# Patient Record
Sex: FEMALE | Race: WHITE | NOT HISPANIC OR LATINO | Employment: OTHER | URBAN - METROPOLITAN AREA
[De-identification: names, ages, dates, MRNs, and addresses within clinical notes are randomized per-mention and may not be internally consistent; named-entity substitution may affect disease eponyms.]

---

## 2017-02-15 ENCOUNTER — OFFICE VISIT (OUTPATIENT)
Dept: FAMILY MEDICINE | Facility: CLINIC | Age: 47
End: 2017-02-15
Payer: COMMERCIAL

## 2017-02-15 ENCOUNTER — MYC MEDICAL ADVICE (OUTPATIENT)
Dept: FAMILY MEDICINE | Facility: CLINIC | Age: 47
End: 2017-02-15

## 2017-02-15 VITALS
OXYGEN SATURATION: 99 % | TEMPERATURE: 97 F | BODY MASS INDEX: 23.76 KG/M2 | DIASTOLIC BLOOD PRESSURE: 74 MMHG | HEART RATE: 80 BPM | HEIGHT: 64 IN | WEIGHT: 139.2 LBS | SYSTOLIC BLOOD PRESSURE: 110 MMHG | RESPIRATION RATE: 14 BRPM

## 2017-02-15 DIAGNOSIS — R07.9 CHEST PAIN, UNSPECIFIED TYPE: Primary | ICD-10-CM

## 2017-02-15 DIAGNOSIS — F41.1 GAD (GENERALIZED ANXIETY DISORDER): ICD-10-CM

## 2017-02-15 LAB — TROPONIN I SERPL-MCNC: NORMAL UG/L (ref 0–0.04)

## 2017-02-15 PROCEDURE — 86141 C-REACTIVE PROTEIN HS: CPT | Performed by: OBSTETRICS & GYNECOLOGY

## 2017-02-15 PROCEDURE — 84484 ASSAY OF TROPONIN QUANT: CPT | Performed by: OBSTETRICS & GYNECOLOGY

## 2017-02-15 PROCEDURE — 36415 COLL VENOUS BLD VENIPUNCTURE: CPT | Performed by: OBSTETRICS & GYNECOLOGY

## 2017-02-15 PROCEDURE — 93000 ELECTROCARDIOGRAM COMPLETE: CPT | Performed by: OBSTETRICS & GYNECOLOGY

## 2017-02-15 PROCEDURE — 99214 OFFICE O/P EST MOD 30 MIN: CPT | Performed by: OBSTETRICS & GYNECOLOGY

## 2017-02-15 ASSESSMENT — ANXIETY QUESTIONNAIRES
3. WORRYING TOO MUCH ABOUT DIFFERENT THINGS: NEARLY EVERY DAY
7. FEELING AFRAID AS IF SOMETHING AWFUL MIGHT HAPPEN: MORE THAN HALF THE DAYS
GAD7 TOTAL SCORE: 15
5. BEING SO RESTLESS THAT IT IS HARD TO SIT STILL: SEVERAL DAYS
IF YOU CHECKED OFF ANY PROBLEMS ON THIS QUESTIONNAIRE, HOW DIFFICULT HAVE THESE PROBLEMS MADE IT FOR YOU TO DO YOUR WORK, TAKE CARE OF THINGS AT HOME, OR GET ALONG WITH OTHER PEOPLE: SOMEWHAT DIFFICULT
6. BECOMING EASILY ANNOYED OR IRRITABLE: MORE THAN HALF THE DAYS
1. FEELING NERVOUS, ANXIOUS, OR ON EDGE: NEARLY EVERY DAY
2. NOT BEING ABLE TO STOP OR CONTROL WORRYING: NEARLY EVERY DAY

## 2017-02-15 ASSESSMENT — PATIENT HEALTH QUESTIONNAIRE - PHQ9: 5. POOR APPETITE OR OVEREATING: SEVERAL DAYS

## 2017-02-15 ASSESSMENT — PAIN SCALES - GENERAL: PAINLEVEL: NO PAIN (0)

## 2017-02-15 NOTE — PROGRESS NOTES
Subjective:  She gets anxious quite often and recently is anxious because she is going on a cruise this weekend. She got some vague chest pains and left arm pains. Had a normal cardiac stress test 10 years ago.  Has noticed some electric shocks in the left arm over the past several days. Wants EKG to be reassured it is not her heart. No squeezing pain. No dyspnea . No HART or PND.   There is a +FH of heart disease.    LANCE score =15    PHQ 9= 8   There are no suicidal thoughts or any serious depression worries.      The past medical history, social history, past surgical history and family history as shown below have been reviewed by me today.  Past Medical History   Diagnosis Date     Endometriosis, site unspecified      Female infertility of unspecified origin         Allergies   Allergen Reactions     Benadryl [Diphenhydramine] Hives     Zithromax [Azithromycin Dihydrate]      Hives, unknown if from Zithromax.  Could have been new face cream or new type of popcorn she had that day      Current Outpatient Prescriptions   Medication Sig Dispense Refill     Multiple Vitamins-Minerals (MULTIVITAL PO)        Past Surgical History   Procedure Laterality Date     C  delivery only  2003     , Low Cervical     Social History     Social History     Marital status:      Spouse name: N/A     Number of children: N/A     Years of education: N/A     Social History Main Topics     Smoking status: Never Smoker     Smokeless tobacco: Never Used     Alcohol use Yes      Comment: occ     Drug use: No     Sexual activity: Yes     Partners: Male     Other Topics Concern     None     Social History Narrative     Family History   Problem Relation Age of Onset     Lipids Mother      high cholesterol     Hypertension Father      HEART DISEASE Father      CAD       ROS: A 12 point review of systems was done. Except for what is listed above in the HPI, the systems review is negative .      Objective: Vital signs:  "Blood pressure 110/74, pulse 80, temperature 97  F (36.1  C), temperature source Tympanic, resp. rate 14, height 5' 4\" (1.626 m), weight 139 lb 3.2 oz (63.1 kg), SpO2 99 %, not currently breastfeeding.    HEENT:  Normal   Sclerae and conjunctiva are normal.   Ear canals and TMs look normal.  Nasal mucosa is pink  - no polyps or masses seen.  sinuses are non tender to palpation.  Throat is unremarkable . Mucous membranes are moist.   Neck is supple, mobile, no adenoapthy or masses palpable. Normal range of motion noted.  Chest is clear to auscultation. No wheezes, rales or rhonchi heard.  cardiac exam is normal with s1, s2, no murmurs or adventitious sounds.Normal rate and rhythm is heard.  Exam of both arms is normal. Normal pulses in the wrists. Normal sensory and motor exams noted.    EKG normal- no ST changes or Q waves. NSR noted.    Assessment/Plan: A total of 25 minutes were spent face-to-face with this patient during today's consultation, with more than 50% of that time devoted to conversation and counseling about the management decisions. Mostly spent discussing anxiety and how to deal with it.      1. Vague chest pains and left arm sx- normal exam. i will check troponin because she is very worried. Also will arrange for stress echo to reassure her. Will call  with lab result.    2.  Anxiety- we discussed walking 3-5 miles a day to help relieve her stress- she is a very anxious person. Medication not advised.        JANNET Marquez MD              "

## 2017-02-15 NOTE — TELEPHONE ENCOUNTER
Gisel Gauthier is a 46 year old female who sent [employer] message with concerns about chest pain.  Patient reports that over the past 10 days she has had 3 episodes of chest pains.  Reports that each time she was in a high anxiety situation, so she is not sure if it is anxiety related.  Reports that pain felt like a electrical shock that would last about 4 hours.  Patient said it is really not painful, but rates pain a 3/10 when it happens.  Reports that she has some tingling down the left arm, but reports that she does have some down the right arm also.  Reports that she is more tired recently, but reports that she feels this is related to not sleeping well.  Denies shortness of breath, nausea or vomiting.  Reports that she is leaving vacation on Sunday and is concerned about symptoms.  Patient reports that she did look up information on the Internet and it was suggested to have lab work and a EKG completed.       NURSING ASSESSMENT:  Description:  Chest pain.   Onset/duration:  Past 10 days.   Precip. factors:  Dx of hyperlipidemia   Associated symptoms:  Tingling down the arms, tired.   Improves/worsens symptoms:  No change.   Pain scale (0-10)   3/10  Last exam/Treatment:  03/09/2016  Allergies:   Allergies   Allergen Reactions     Benadryl [Diphenhydramine] Hives     Zithromax [Azithromycin Dihydrate]      Hives, unknown if from Zithromax.  Could have been new face cream or new type of popcorn she had that day      NURSING PLAN: Routed to provider Yes    RECOMMENDED DISPOSITION:  Patient declined ED as she reports that she cannot afford that with her insurance at this time.  Patient reports that she only wants lab work and EKG per suggestions on the Internet.  Reports that she leaves on Sunday and would like to put her mind at ease that there is nothing wrong before she leaves.  Will route to PCP for treatment, orders, or appointment.   Will comply with recommendation: Yes  If further questions/concerns or if  symptoms do not improve, worsen or new symptoms develop, call your PCP or [facility] as soon as possible.    Guideline used:  Chest pain  Telephone Triage Protocols for Nurses, Fifth Edition, Anamaria Mota RN

## 2017-02-15 NOTE — MR AVS SNAPSHOT
After Visit Summary   2/15/2017    Gisel Gauthier    MRN: [id number]           Patient Information     Date Of Birth          1970        Visit Information        Provider Department      2/15/2017 3:30 PM Everett Marquez MD [facility]         Follow-ups after your visit        Your next 10 appointments already scheduled     Feb 15, 2017  3:30 PM CST   Office Visit with Everett Marquez MD   [facility]    [address] [place] [id number] [phone]           Bring a current list of meds and any records pertaining to this visit.  For Physicals, please bring immunization records and any forms needing to be filled out.  Please arrive 10 minutes early to complete paperwork.              Who to contact     If you have questions or need follow up information about today's clinic visit or your schedule please contact [facility] directly at [phone].  Normal or non-critical lab and imaging results will be communicated to you by [employer] Labhart, letter or phone within 4 business days after the clinic has received the results. If you do not hear from us within 7 days, please contact the clinic through [employer]el Toned[employer] or phone. If you have a critical or abnormal lab result, we will notify you by phone as soon as possible.  Submit refill requests through [employer] or call your pharmacy and they will forward the refill request to us. Please allow 3 business days for your refill to be completed.          Additional Information About Your Visit        MyChart Information     [employer] gives you secure access to your electronic health record. If you see a primary care provider, you can also send messages to your care team and make appointments. If you have questions, please call your primary care clinic.  If you do not have a primary care provider, please call [phone] and they will assist you.        Care  "EveryWhere ID     This is your Care EveryWhere ID. This could be used by other organizations to access your [facility] medical records  [id number]        Your Vitals Were     Pulse Temperature Respirations Height Pulse Oximetry Breastfeeding?    80 97  F (36.1  C) (Tympanic) 14 5' 4\" (1.626 m) 99% No    BMI (Body Mass Index)                   23.89 kg/m2            Blood Pressure from Last 3 Encounters:   02/15/17 110/74   10/30/16 110/79   10/30/16 107/60    Weight from Last 3 Encounters:   02/15/17 139 lb 3.2 oz (63.1 kg)   10/30/16 135 lb (61.2 kg)   03/09/16 138 lb 4.8 oz (62.7 kg)              Today, you had the following     No orders found for display       Primary Care Provider Office Phone # Fax #    Everett Marquez [phone]       [place] [address] [place] [address]        Thank you!     Thank you for choosing [facility]  for your care. Our goal is always to provide you with excellent care. Hearing back from our patients is one way we can continue to improve our services. Please take a few minutes to complete the written survey that you may receive in the mail after your visit with us. Thank you!             Your Updated Medication List - Protect others around you: Learn how to safely use, store and throw away your medicines at www.disposemymeds.org.          This list is accurate as of: 2/15/17 11:41 AM.  Always use your most recent med list.                   Brand Name Dispense Instructions for use    MULTIVITAL PO            "

## 2017-02-15 NOTE — NURSING NOTE
"Chief Complaint   Patient presents with     Chest Pain       Initial /74 (BP Location: Left arm, Patient Position: Chair, Cuff Size: Adult Small)  Pulse 80  Temp 97  F (36.1  C) (Tympanic)  Resp 14  Ht 5' 4\" (1.626 m)  Wt 139 lb 3.2 oz (63.1 kg)  SpO2 99%  Breastfeeding? No  BMI 23.89 kg/m2 Estimated body mass index is 23.89 kg/(m^2) as calculated from the following:    Height as of this encounter: 5' 4\" (1.626 m).    Weight as of this encounter: 139 lb 3.2 oz (63.1 kg)..   BP completed using cuff size: idania Almonte CMA    "

## 2017-02-15 NOTE — LETTER
STRESS ECHO TEST   PAMPHLET: STRESS ECHOCARDIOGRAPH    Date of Appointment:______________Time: ________                                                                                     [place] at the central registration desk.    INSTRUCTIONS    1. NO CAFFEINE  the day of the test.  Examples: tea, coffee, pop, Anacin, Excedrin and chocolate products.  Other products/medications may contain caffeine, if in doubt, read the labels or call your pharmacist.    2. You may eat lightly up to three hours prior to the test.    3. No smoking on the day of the test.    4. Plan sixty to ninety minutes for your test.    5. Wear a comfortable two piece outfit and comfortable walking shoes.    6. If you wear a Nitro-Patch, do not put one on the morning of the test.    7. Take your medications in the morning with the exception of beta blockers. (refer to list below)      BETA BLOCKERS  Acebutolol, Atenolol, Beta-Chron, Betapace, Betaxolol, Bisoprolol,Blocadren, Carteolol, Cartrol, Carvediol, Coreg, Corgard, Corzide, Dectral, Fumarate, Inderal, Inderal LA, Inderide, Kerlone, Labetolol, Levatolol, Lopressor, Lopressor HCT, Metoprolol, Metoprolol XL, Nadolol, Normodyne, Penbutolol, Pindolol, Propanolol, Propanolol LA, Sectral, Sotalol, Tenoretic, Tenormin, Timolide, Timolol, Toprol XL, Trandate, Visken, Zebeta, Ziac    If you have significant problems with your lower extremities which would restrict you from walking at a brisk pace for five to ten minutes, please notify your physician.    Please bring a list of your medications along with you to this appointment.    If you have any questions please call us at [phone] or [phone] Monday through Friday 8am to 5pm.                    PATIENT INFORMATION  - STRESS ECHOCARDIOGRAPHY    The stress echocardiogram is a non-invasive test that combines a treadmill test and an echocardiogram.  An echocardiogram is done at rest prior to exercise and again after exercising.  The  echocardiogram uses sound waves (ultrasound) to provide an image of the heart.    You will be asked to fill out an information sheet regarding your current health status.  Questions will be asked related to your heart history.    The nurse or exercise physiologist will explain the procedure to you.  The upper chest will be exposed for placement of electrodes.  Women may wear a hospital gown during the test.  Men with chest hair will be shaved in the areas where the electrodes are placed.    You will be connected to an electrocardiogram machine.  An electrocardiogram and blood pressure will be monitored with you lying down and again standing still prior to exercising.    An echocardiogram will be done prior to exercising.  After this is completed, you will be ready for the treadmill portion of the test.    With staff in attendance, the treadmill will be started.  Periodically the speed and the grade of the treadmill will increase.  Let the staff know if you are experiencing chest pain, shortness of breath or other symptoms.    The length of time on the treadmill is individual.  However, the usual length of time is from five to ten minutes of walking.    After the heart rate is achieved to give us an accurate test, you will need to lie down very quickly and have an echocardiogram done with your heart rate elevated.    Please allow 60-90 minutes for the test to be completed.    The physician will review the results with you immediately after the test and send a final report to your Primary Care Provider.    If you have any special needs, please notify the department.  If you need an , please notify the department.  If you have questions, you may call the Outpatient Specialty Scheduling Department at [phone] or [phone] Monday through Friday 8am to 5pm.

## 2017-02-15 NOTE — TELEPHONE ENCOUNTER
Spoke with patient, per RM ok to add to schedule but cannot do this outpatient for her. She needs and office visit. Patient agreeable and will present to clinic.  Ann Almonte, CMA

## 2017-02-16 ASSESSMENT — PATIENT HEALTH QUESTIONNAIRE - PHQ9: SUM OF ALL RESPONSES TO PHQ QUESTIONS 1-9: 8

## 2017-02-16 ASSESSMENT — ANXIETY QUESTIONNAIRES: GAD7 TOTAL SCORE: 15

## 2017-02-17 LAB — CRP SERPL HS-MCNC: 1 MG/L

## 2017-02-20 NOTE — PROGRESS NOTES
Ann Please inform Gisel/ or caretaker  that this result(s) is/are normal.  The CRP is normal-Thanks. JANNET Marquez MD

## 2017-06-21 ENCOUNTER — TELEPHONE (OUTPATIENT)
Dept: FAMILY MEDICINE | Facility: CLINIC | Age: 47
End: 2017-06-21

## 2017-06-21 NOTE — TELEPHONE ENCOUNTER
Left message for patient to call back. Patient is due for pap. Please assist in scheduling. If patient declines please note and route back to care team. If the patient states that the order has been completed elsewhere, please obtain the date (month/year; day if available) and location and route back to care team for abstracting.

## 2017-06-21 NOTE — TELEPHONE ENCOUNTER
Panel Management Review      Patient has the following on her problem list: None      Composite cancer screening  Chart review shows that this patient is due/due soon for the following Pap Smear  Summary:    Patient is due/failing the following:   PAP    Action needed:   Patient needs office visit for pap.    Type of outreach:    Phone, left message for patient to call back.     Questions for provider review:    None                                                                                                                                    Ann Almonte CMA       Chart routed to Care Team .

## 2017-06-23 ENCOUNTER — HEALTH MAINTENANCE LETTER (OUTPATIENT)
Age: 47
End: 2017-06-23

## 2017-06-28 ENCOUNTER — OFFICE VISIT (OUTPATIENT)
Dept: FAMILY MEDICINE | Facility: CLINIC | Age: 47
End: 2017-06-28
Payer: COMMERCIAL

## 2017-06-28 VITALS
SYSTOLIC BLOOD PRESSURE: 100 MMHG | BODY MASS INDEX: 24.55 KG/M2 | DIASTOLIC BLOOD PRESSURE: 72 MMHG | HEART RATE: 76 BPM | RESPIRATION RATE: 16 BRPM | TEMPERATURE: 97.5 F | WEIGHT: 143 LBS | OXYGEN SATURATION: 99 %

## 2017-06-28 DIAGNOSIS — Z12.4 SCREENING FOR MALIGNANT NEOPLASM OF CERVIX: ICD-10-CM

## 2017-06-28 DIAGNOSIS — Z00.00 ENCOUNTER FOR WELL ADULT EXAM WITHOUT ABNORMAL FINDINGS: Primary | ICD-10-CM

## 2017-06-28 PROCEDURE — G0145 SCR C/V CYTO,THINLAYER,RESCR: HCPCS | Performed by: OBSTETRICS & GYNECOLOGY

## 2017-06-28 PROCEDURE — 87624 HPV HI-RISK TYP POOLED RSLT: CPT | Performed by: OBSTETRICS & GYNECOLOGY

## 2017-06-28 PROCEDURE — 99213 OFFICE O/P EST LOW 20 MIN: CPT | Performed by: OBSTETRICS & GYNECOLOGY

## 2017-06-28 ASSESSMENT — PAIN SCALES - GENERAL: PAINLEVEL: NO PAIN (0)

## 2017-06-28 NOTE — PROGRESS NOTES
Subjective: she is due for pap and asked to have pap and pelvic exam done today. She has occasional pains over the right adnexal area.      The past medical history, social history, past surgical history and family history as shown below have been reviewed by me today.  Past Medical History:   Diagnosis Date     Endometriosis, site unspecified      Female infertility of unspecified origin         Allergies   Allergen Reactions     Benadryl [Diphenhydramine] Hives     Zithromax [Azithromycin Dihydrate]      Hives, unknown if from Zithromax.  Could have been new face cream or new type of popcorn she had that day      Current Outpatient Prescriptions   Medication Sig Dispense Refill     Multiple Vitamins-Minerals (MULTIVITAL PO)        Past Surgical History:   Procedure Laterality Date     C  DELIVERY ONLY  2003    , Low Cervical     Social History     Social History     Marital status:      Spouse name: N/A     Number of children: N/A     Years of education: N/A     Social History Main Topics     Smoking status: Never Smoker     Smokeless tobacco: Never Used     Alcohol use Yes      Comment: occ     Drug use: No     Sexual activity: Yes     Partners: Male     Other Topics Concern     Not on file     Social History Narrative     Family History   Problem Relation Age of Onset     Lipids Mother      high cholesterol     Hypertension Father      HEART DISEASE Father      CAD       ROS: A 12 point review of systems was done. Except for what is listed above in the HPI, the systems review is negative .      Objective: Vital signs: Blood pressure 100/72, pulse 76, temperature 97.5  F (36.4  C), temperature source Tympanic, resp. rate 16, weight 143 lb (64.9 kg), last menstrual period 2017, SpO2 99 %, not currently breastfeeding.    Pelvic exam:My nurse Ann   was present to chaperone the exam.    The external genitalia appeared normal.      The vaginal vault was without bleeding  or    discharge or odor.      The cervix was smooth and shiny and normal in appearance.      A pap was obtained.       No vaginal support defects were noted,      Bimanual exam revealed a  6  week   sized uterus. It does not  descend   well in the vaginal vault.       The right adnexa is slightly prominent so i advised her to consider a pelvic US but she declined.    There was no  cervical motion tenderness.      Exam was NOT   limited by the patient's body habitus.              Assessment/Plan:    1.  Normal pelvic exam except for slighly prominent right ovary- i wonder if she has a cyst- she declined US- will let me know if she devlops any significant pain or fullness on that side-  We will notify her of pap results.    Note: i saw that her ET  Was slightly prominent last year and due to stenotic cervix i couldn't get embx- i advised her to have US now to look at the ET- she declined- she will let me know if she devlops any bleeding inbetween menses and then we will look into Hysteroscopy, dilatation and curettage      JANNET Marquez MD

## 2017-06-28 NOTE — MR AVS SNAPSHOT
After Visit Summary   6/28/2017    Gisel Gauthier    MRN: [id number]           Patient Information     Date Of Birth          1970        Visit Information        Provider Department      6/28/2017 1:50 PM Everett Marquez MD [facility]        Today's Diagnoses     Encounter for well adult exam without abnormal findings    -  1    Screening for malignant neoplasm of cervix           Follow-ups after your visit        Who to contact     If you have questions or need follow up information about today's clinic visit or your schedule please contact [facility] directly at [phone].  Normal or non-critical lab and imaging results will be communicated to you by [employer] Airlinehart, letter or phone within 4 business days after the clinic has received the results. If you do not hear from us within 7 days, please contact the clinic through [employer] Airlinehart or phone. If you have a critical or abnormal lab result, we will notify you by phone as soon as possible.  Submit refill requests through [employer] or call your pharmacy and they will forward the refill request to us. Please allow 3 business days for your refill to be completed.          Additional Information About Your Visit        MyChart Information     [employer] gives you secure access to your electronic health record. If you see a primary care provider, you can also send messages to your care team and make appointments. If you have questions, please call your primary care clinic.  If you do not have a primary care provider, please call [phone] and they will assist you.        Care EveryWhere ID     This is your Care EveryWhere ID. This could be used by other organizations to access your [facility] medical records  [id number]        Your Vitals Were     Pulse Temperature Respirations Last Period Pulse Oximetry Breastfeeding?    76 97.5  F (36.4  C) (Tympanic) 16 06/12/2017 (Approximate) 99% No    BMI (Body Mass Index)                    24.55 kg/m2            Blood Pressure from Last 3 Encounters:   06/28/17 100/72   02/15/17 110/74   10/30/16 110/79    Weight from Last 3 Encounters:   06/28/17 143 lb (64.9 kg)   02/15/17 139 lb 3.2 oz (63.1 kg)   10/30/16 135 lb (61.2 kg)              We Performed the Following     HPV High Risk Types DNA Cervical     Pap imaged thin layer screen with HPV - recommended age 30 - 65 years (select HPV order below)        Primary Care Provider Office Phone # Fax #    Everett Moses Marquez [phone]       [facility] [address] [place] [address]        Equal Access to Services     MERON LIGHT : Hadii aad ku hadasho Sogeorge, waaxda luqadaha, qaybta kaalmada adeegyada, blanca guerrero . So [facility] [phone].    ATENCIÓN: Si habla español, tiene a timmons disposición servicios gratuitos de asistencia lingüística. Llame al [phone].    We comply with applicable federal civil rights laws and [place] laws. We do not discriminate on the basis of race, color, national origin, age, disability sex, sexual orientation or gender identity.            Thank you!     Thank you for choosing [facility]  for your care. Our goal is always to provide you with excellent care. Hearing back from our patients is one way we can continue to improve our services. Please take a few minutes to complete the written survey that you may receive in the mail after your visit with us. Thank you!             Your Updated Medication List - Protect others around you: Learn how to safely use, store and throw away your medicines at www.disposemymeds.org.          This list is accurate as of: 6/28/17  2:40 PM.  Always use your most recent med list.                   Brand Name Dispense Instructions for use Diagnosis    MULTIVITAL PO

## 2017-06-28 NOTE — NURSING NOTE
"Chief Complaint   Patient presents with     Gyn Exam     Pap       Initial /72 (Cuff Size: Adult Regular)  Pulse 76  Temp 97.5  F (36.4  C) (Tympanic)  Resp 16  Wt 143 lb (64.9 kg)  LMP 06/12/2017 (Approximate)  SpO2 99%  Breastfeeding? No  BMI 24.55 kg/m2 Estimated body mass index is 24.55 kg/(m^2) as calculated from the following:    Height as of 2/15/17: 5' 4\" (1.626 m).    Weight as of this encounter: 143 lb (64.9 kg).  Medication Reconciliation: complete   Trent Ortiz MA      "

## 2017-07-05 LAB
COPATH REPORT: NORMAL
PAP: NORMAL

## 2017-07-07 ENCOUNTER — MYC MEDICAL ADVICE (OUTPATIENT)
Dept: FAMILY MEDICINE | Facility: CLINIC | Age: 47
End: 2017-07-07

## 2017-07-07 LAB
FINAL DIAGNOSIS: NORMAL
HPV HR 12 DNA CVX QL NAA+PROBE: NEGATIVE
HPV16 DNA SPEC QL NAA+PROBE: NEGATIVE
HPV18 DNA SPEC QL NAA+PROBE: NEGATIVE
SPECIMEN DESCRIPTION: NORMAL

## 2017-07-14 ENCOUNTER — MYC MEDICAL ADVICE (OUTPATIENT)
Dept: FAMILY MEDICINE | Facility: CLINIC | Age: 47
End: 2017-07-14

## 2017-07-14 DIAGNOSIS — R93.89 THICKENED ENDOMETRIUM: Primary | ICD-10-CM

## 2017-07-17 NOTE — TELEPHONE ENCOUNTER
Ok per RM for ultrasound. Order placed and Conversert message sent to patient for scheduling  Ann Almonte CMA

## 2017-07-20 ENCOUNTER — HOSPITAL ENCOUNTER (OUTPATIENT)
Dept: ULTRASOUND IMAGING | Facility: CLINIC | Age: 47
Discharge: HOME | End: 2017-07-20
Attending: OBSTETRICS & GYNECOLOGY | Admitting: OBSTETRICS & GYNECOLOGY
Payer: COMMERCIAL

## 2017-07-20 DIAGNOSIS — R93.89 THICKENED ENDOMETRIUM: ICD-10-CM

## 2017-07-20 PROCEDURE — 76830 TRANSVAGINAL US NON-OB: CPT

## 2017-07-24 NOTE — PROGRESS NOTES
Ann Please inform Gisel/ or caretaker  that this result(s) is/are normal. The lining is normal thickness and the ovaries look normal.Thanks. JANNET Marquez MD

## 2017-12-01 ENCOUNTER — OFFICE VISIT (OUTPATIENT)
Dept: FAMILY MEDICINE | Facility: CLINIC | Age: 47
End: 2017-12-01
Payer: COMMERCIAL

## 2017-12-01 VITALS
WEIGHT: 148 LBS | HEART RATE: 80 BPM | OXYGEN SATURATION: 100 % | DIASTOLIC BLOOD PRESSURE: 74 MMHG | TEMPERATURE: 98.6 F | BODY MASS INDEX: 25.27 KG/M2 | SYSTOLIC BLOOD PRESSURE: 122 MMHG | HEIGHT: 64 IN

## 2017-12-01 DIAGNOSIS — R39.11 URINARY HESITANCY: Primary | ICD-10-CM

## 2017-12-01 LAB
ALBUMIN UR-MCNC: NEGATIVE MG/DL
APPEARANCE UR: ABNORMAL
BILIRUB UR QL STRIP: NEGATIVE
COLOR UR AUTO: YELLOW
GLUCOSE UR STRIP-MCNC: NEGATIVE MG/DL
HGB UR QL STRIP: NEGATIVE
KETONES UR STRIP-MCNC: NEGATIVE MG/DL
LEUKOCYTE ESTERASE UR QL STRIP: ABNORMAL
MUCOUS THREADS #/AREA URNS LPF: PRESENT /LPF
NITRATE UR QL: NEGATIVE
PH UR STRIP: 7 PH (ref 5–7)
RBC #/AREA URNS AUTO: 1 /HPF (ref 0–2)
SOURCE: ABNORMAL
SP GR UR STRIP: 1.02 (ref 1–1.03)
SQUAMOUS #/AREA URNS AUTO: 12 /HPF (ref 0–1)
UROBILINOGEN UR STRIP-MCNC: 2 MG/DL (ref 0–2)
WBC #/AREA URNS AUTO: 1 /HPF (ref 0–2)

## 2017-12-01 PROCEDURE — 99213 OFFICE O/P EST LOW 20 MIN: CPT | Performed by: FAMILY MEDICINE

## 2017-12-01 PROCEDURE — 81001 URINALYSIS AUTO W/SCOPE: CPT | Performed by: FAMILY MEDICINE

## 2017-12-01 RX ORDER — SULFAMETHOXAZOLE/TRIMETHOPRIM 800-160 MG
1 TABLET ORAL 2 TIMES DAILY
Qty: 6 TABLET | Refills: 0 | Status: SHIPPED | OUTPATIENT
Start: 2017-12-01 | End: 2017-12-04

## 2017-12-01 ASSESSMENT — PAIN SCALES - GENERAL: PAINLEVEL: MILD PAIN (2)

## 2017-12-01 NOTE — MR AVS SNAPSHOT
"              After Visit Summary   12/1/2017    Gisel Gauthier    MRN: [id number]           Patient Information     Date Of Birth          1970        Visit Information        Provider Department      12/1/2017 8:45 AM Marek Dotson MD [facility]        Today's Diagnoses     Urinary hesitancy    -  1       Follow-ups after your visit        Who to contact     If you have questions or need follow up information about today's clinic visit or your schedule please contact [facility] directly at [phone].  Normal or non-critical lab and imaging results will be communicated to you by [employer], letter or phone within 4 business days after the clinic has received the results. If you do not hear from us within 7 days, please contact the clinic through [employer] or phone. If you have a critical or abnormal lab result, we will notify you by phone as soon as possible.  Submit refill requests through [employer] or call your pharmacy and they will forward the refill request to us. Please allow 3 business days for your refill to be completed.          Additional Information About Your Visit        MyChart Information     [employer] gives you secure access to your electronic health record. If you see a primary care provider, you can also send messages to your care team and make appointments. If you have questions, please call your primary care clinic.  If you do not have a primary care provider, please call [phone] and they will assist you.        Care EveryWhere ID     This is your Care EveryWhere ID. This could be used by other organizations to access your [facility] medical records  [id number]        Your Vitals Were     Pulse Temperature Height Pulse Oximetry BMI (Body Mass Index)       80 98.6  F (37  C) (Temporal) 5' 4\" (1.626 m) 100% 25.4 kg/m2        Blood Pressure from Last 3 Encounters:   12/01/17 122/74   06/28/17 100/72   02/15/17 110/74    Weight from Last 3 Encounters: "   12/01/17 148 lb (67.1 kg)   06/28/17 143 lb (64.9 kg)   02/15/17 139 lb 3.2 oz (63.1 kg)              We Performed the Following     UA with Microscopic (Bhavna Jeronimo; [facility])          Today's Medication Changes          These changes are accurate as of: 12/1/17 11:59 PM.  If you have any questions, ask your nurse or doctor.               Start taking these medicines.        Dose/Directions    sulfamethoxazole-trimethoprim 800-160 MG per tablet   Commonly known as:  BACTRIM DS   Used for:  Urinary hesitancy   Started by:  Marek Dotson MD        Dose:  1 tablet   Take 1 tablet by mouth 2 times daily for 3 days   Quantity:  6 tablet   Refills:  0            Where to get your medicines      Some of these will need a paper prescription and others can be bought over the counter.  Ask your nurse if you have questions.     Bring a paper prescription for each of these medications     sulfamethoxazole-trimethoprim 800-160 MG per tablet                Primary Care Provider Office Phone # Fax #    Everett Marquez [phone]       [address] [place] [address]        Equal Access to Services     PRACHI [facility] HospitalJANNET AH: Hadii riki smith hadasho Sogeorge, waaxda luqadaha, qaybta kaalmada adehakan, blanca guerrero . So [facility] [phone].    ATENCIÓN: Si habla español, tiene a timmons disposición servicios gratuitos de asistencia lingüística. [facility] [phone].    We comply with applicable federal civil rights laws and [place] laws. We do not discriminate on the basis of race, color, national origin, age, disability, sex, sexual orientation, or gender identity.            Thank you!     Thank you for choosing [facility]  for your care. Our goal is always to provide you with excellent care. Hearing back from our patients is one way we can continue to improve our services. Please take a few minutes to complete the written survey that you may  receive in the mail after your visit with us. Thank you!             Your Updated Medication List - Protect others around you: Learn how to safely use, store and throw away your medicines at www.disposemymeds.org.          This list is accurate as of: 12/1/17 11:59 PM.  Always use your most recent med list.                   Brand Name Dispense Instructions for use Diagnosis    MULTIVITAL PO           sulfamethoxazole-trimethoprim 800-160 MG per tablet    BACTRIM DS    6 tablet    Take 1 tablet by mouth 2 times daily for 3 days    Urinary hesitancy

## 2017-12-01 NOTE — NURSING NOTE
"Chief Complaint   Patient presents with     UTI     nagging backache for 1 week, on right side, urine is dark.        Initial /74  Pulse 80  Temp 98.6  F (37  C) (Temporal)  Ht 5' 4\" (1.626 m)  Wt 148 lb (67.1 kg)  SpO2 100%  BMI 25.4 kg/m2 Estimated body mass index is 25.4 kg/(m^2) as calculated from the following:    Height as of this encounter: 5' 4\" (1.626 m).    Weight as of this encounter: 148 lb (67.1 kg).  Medication Reconciliation: complete    "

## 2017-12-01 NOTE — PROGRESS NOTES
"  SUBJECTIVE:   Gisel Gauthier is a 47 year old female who presents to clinic today for the following health issues:      URINARY TRACT SYMPTOMS  Onset: 1 week     Description:   Painful urination (Dysuria): no   Blood in urine (Hematuria): no   Delay in urine (Hesitency): YES    Intensity: mild    Progression of Symptoms:  same    Accompanying Signs & Symptoms:  Fever/chills: no   Flank pain YES, right side  Nausea and vomiting: no   Any vaginal symptoms: none  Abdominal/Pelvic Pain: YES    History:   History of frequent UTI's: yes, but not for a long time.    History of kidney stones: no   Sexually Active: YES  Possibility of pregnancy: No    Precipitating factors:       Therapies Tried and outcome:   Increase fluid intake      Problem list and histories reviewed & adjusted, as indicated.  Additional history: as documented    Reviewed and updated as needed this visit by clinical staffTobacco  Allergies  Meds  Problems  Med Hx  Surg Hx  Fam Hx  Soc Hx        Reviewed and updated as needed this visit by Provider  Allergies  Meds  Problems         Patient does not remember what happened with past UTI as it was a long time ago, but she does not feel like it is exactly a UTI.  She has hesitancy only.  She is leaving [facility] for a trip this weekend and is worried about progression of symptoms.  She has been drinking plenty of fluids to help clear possible UTI.  No hematuria.  Her above noted flank pain is more lower right sided back pain than it is up at level of kidney.      ROS:  10 point ROS of systems including Constitutional, Eyes, Respiratory, Cardiovascular, Gastroenterology, Genitourinary, Integumentary, Muscularskeletal, Psychiatric were all negative except for pertinent positives noted in my HPI.     OBJECTIVE:   /74  Pulse 80  Temp 98.6  F (37  C) (Temporal)  Ht 5' 4\" (1.626 m)  Wt 148 lb (67.1 kg)  SpO2 100%  BMI 25.4 kg/m2  Body mass index is 25.4 kg/(m^2).  Physical Exam "   Constitutional: She appears well-developed and well-nourished.   Cardiovascular: Normal rate, regular rhythm, S1 normal, S2 normal and normal heart sounds.    No murmur heard.  Pulmonary/Chest: Effort normal and breath sounds normal. No respiratory distress. She has no wheezes. She has no rhonchi. She has no rales.   Abdominal: Bowel sounds are normal. There is tenderness in the suprapubic area. There is no CVA tenderness.   Neurological: She is alert.       Results for orders placed or performed in visit on 12/01/17   UA with Microscopic ([facility])   Result Value Ref Range    Color Urine Yellow     Appearance Urine Slightly Cloudy     Glucose Urine Negative NEG^Negative mg/dL    Bilirubin Urine Negative NEG^Negative    Ketones Urine Negative NEG^Negative mg/dL    Specific Gravity Urine 1.018 1.003 - 1.035    Blood Urine Negative NEG^Negative    pH Urine 7.0 5.0 - 7.0 pH    Protein Albumin Urine Negative NEG^Negative mg/dL    Urobilinogen mg/dL 2.0 0.0 - 2.0 mg/dL    Nitrite Urine Negative NEG^Negative    Leukocyte Esterase Urine Trace (A) NEG^Negative    Source Unspecified Urine     WBC Urine 1 0 - 2 /HPF    RBC Urine 1 0 - 2 /HPF    Squamous Epithelial /HPF Urine 12 (H) 0 - 1 /HPF    Mucous Urine Present (A) NEG^Negative /LPF        ASSESSMENT/PLAN:       ICD-10-CM    1. Urinary frequency R35.0 UA with Microscopic ([facility])     sulfamethoxazole-trimethoprim (BACTRIM DS) 800-160 MG per tablet     PLAN:  1.  I do not think patient has UTI at this time based on her symptoms, exam and lab results.  Since she is going out of town for the weekend, I will print out a prescription for bactrim to have with her and bring to any pharmacy to fill if she has worsening of UTI symptoms.      Follow up with Provider - only if symptoms do not fully resolve within a week, sooner if they worsen.     Marek Dotson MD   [facility]

## 2018-03-27 ENCOUNTER — E-VISIT (OUTPATIENT)
Dept: FAMILY MEDICINE | Facility: CLINIC | Age: 48
End: 2018-03-27
Payer: COMMERCIAL

## 2018-03-27 DIAGNOSIS — Z53.9 ERRONEOUS ENCOUNTER--DISREGARD: Primary | ICD-10-CM

## 2018-03-27 PROCEDURE — 99207 ZZC NO BILLABLE SERVICE THIS VISIT: CPT | Performed by: OBSTETRICS & GYNECOLOGY

## 2018-03-27 NOTE — MR AVS SNAPSHOT
After Visit Summary   3/27/2018    Gisel Gauthier    MRN: [id number]           Patient Information     Date Of Birth          1970        Visit Information        Provider Department      3/27/2018 6:07 AM Everett Marquez MD [facility]        Today's Diagnoses     ERRONEOUS ENCOUNTER--DISREGARD    -  1       Follow-ups after your visit        Who to contact     If you have questions or need follow up information about today's clinic visit or your schedule please contact [facility] directly at [phone].  Normal or non-critical lab and imaging results will be communicated to you by [employer], letter or phone within 4 business days after the clinic has received the results. If you do not hear from us within 7 days, please contact the clinic through [employer] or phone. If you have a critical or abnormal lab result, we will notify you by phone as soon as possible.  Submit refill requests through [email or web address] or call your pharmacy and they will forward the refill request to us. Please allow 3 business days for your refill to be completed.          Additional Information About Your Visit        MyChart Information     [email or web address] gives you secure access to your electronic health record. If you see a primary care provider, you can also send messages to your care team and make appointments. If you have questions, please call your primary care clinic.  If you do not have a primary care provider, please call [phone] and they will assist you.        Care EveryWhere ID     This is your Care EveryWhere ID. This could be used by other organizations to access your [facility] medical records  [id number]         Blood Pressure from Last 3 Encounters:   12/01/17 122/74   06/28/17 100/72   02/15/17 110/74    Weight from Last 3 Encounters:   12/01/17 148 lb (67.1 kg)   06/28/17 143 lb (64.9 kg)   02/15/17 139 lb 3.2 oz (63.1 kg)              Today, you had the following     No  orders found for display       Primary Care Provider Office Phone # Fax #    Everett Marquez [phone]       [address] [place] [address]        Equal Access to Services     MERON LIGHT : Hadsid smith hannaho Somireilleali, waaxda luqadaha, qaybta kaalmada adeegyada, blanca arreolan adrianaazam guerrero [place]. So [facility] [phone].    ATENCIÓN: Si habla español, tiene a timmons disposición servicios gratuitos de asistencia lingüística. Llame al [phone].    We comply with applicable federal civil rights laws and [place] laws. We do not discriminate on the basis of race, color, national origin, age, disability, sex, sexual orientation, or gender identity.            Thank you!     Thank you for choosing [facility]  for your care. Our goal is always to provide you with excellent care. Hearing back from our patients is one way we can continue to improve our services. Please take a few minutes to complete the written survey that you may receive in the mail after your visit with us. Thank you!             Your Updated Medication List - Protect others around you: Learn how to safely use, store and throw away your medicines at www.disposemymeds.org.          This list is accurate as of 3/27/18  7:43 AM.  Always use your most recent med list.                   Brand Name Dispense Instructions for use Diagnosis    MULTIVITAL PO

## 2018-03-28 ENCOUNTER — MYC MEDICAL ADVICE (OUTPATIENT)
Dept: FAMILY MEDICINE | Facility: CLINIC | Age: 48
End: 2018-03-28

## 2018-03-28 DIAGNOSIS — R07.9 CHEST PAIN, UNSPECIFIED TYPE: Primary | ICD-10-CM

## 2018-03-28 NOTE — TELEPHONE ENCOUNTER
Gisel Gauthier is a 47 year old female-     I spoke with patient.   She states that this chest pain is the same that she has been experiencing for the past couple years. She was aware of the plan to do a stress test and was going to do it, but then she started feeling better.   The last few months her symptoms have returned. Reports feeling fatigued, winded when going up stairs and a slight ache in her chest from time to time.  DENIES chest pain currently, difficulty breathing, shortness of breath, pain that radiates to her arm or jaw.  Pt states that these are the same symptoms she had in the past.  She has a history of heart disease in her family and has put on some weight so she would like to have the stress test now.  DECLINED going to the ER or scheduling an office appt.   Would like a message sent to provider to order stress test. Pt aware that it will be tomorrow for response due to time of day.   Please contact pt with recommendations.   Gabriela Holden RN, BSN

## 2018-03-28 NOTE — TELEPHONE ENCOUNTER
RN triage for symptoms as the encounter we saw her for her chest pains was 1 year ago.    Ann Almonte, CMA

## 2018-03-29 NOTE — TELEPHONE ENCOUNTER
Ok per RM for stress echo. Order placed and number sent to patient to schedule via [employer].  Ann Almonte, KAROLINA

## 2018-04-02 ENCOUNTER — HOSPITAL ENCOUNTER (OUTPATIENT)
Dept: CARDIOLOGY | Facility: CLINIC | Age: 48
Discharge: HOME | End: 2018-04-02
Attending: OBSTETRICS & GYNECOLOGY | Admitting: OBSTETRICS & GYNECOLOGY
Payer: COMMERCIAL

## 2018-04-02 DIAGNOSIS — R07.9 CHEST PAIN, UNSPECIFIED TYPE: ICD-10-CM

## 2018-04-02 PROCEDURE — 93350 STRESS TTE ONLY: CPT | Mod: 26 | Performed by: INTERNAL MEDICINE

## 2018-04-02 PROCEDURE — 93016 CV STRESS TEST SUPVJ ONLY: CPT | Performed by: INTERNAL MEDICINE

## 2018-04-02 PROCEDURE — 93321 DOPPLER ECHO F-UP/LMTD STD: CPT | Mod: 26 | Performed by: INTERNAL MEDICINE

## 2018-04-02 PROCEDURE — 25500064 ZZH RX 255 OP 636: Performed by: OBSTETRICS & GYNECOLOGY

## 2018-04-02 PROCEDURE — 93017 CV STRESS TEST TRACING ONLY: CPT | Performed by: REHABILITATION PRACTITIONER

## 2018-04-02 PROCEDURE — 93018 CV STRESS TEST I&R ONLY: CPT | Performed by: INTERNAL MEDICINE

## 2018-04-02 PROCEDURE — 93325 DOPPLER ECHO COLOR FLOW MAPG: CPT | Mod: 26 | Performed by: INTERNAL MEDICINE

## 2018-04-02 RX ADMIN — HUMAN ALBUMIN MICROSPHERES AND PERFLUTREN 3 ML: 10; .22 INJECTION, SOLUTION INTRAVENOUS at 15:36

## 2018-04-04 NOTE — PROGRESS NOTES
Ann Please inform Gisel/ or caretaker  that this result(s) is/are normal.  Thanks. JANNET Marquez MD

## 2019-09-04 ENCOUNTER — OFFICE VISIT (OUTPATIENT)
Dept: FAMILY MEDICINE | Facility: CLINIC | Age: 49
End: 2019-09-04
Payer: COMMERCIAL

## 2019-09-04 VITALS
SYSTOLIC BLOOD PRESSURE: 102 MMHG | DIASTOLIC BLOOD PRESSURE: 66 MMHG | HEART RATE: 80 BPM | HEIGHT: 64 IN | OXYGEN SATURATION: 99 % | TEMPERATURE: 98.3 F | BODY MASS INDEX: 26.19 KG/M2 | RESPIRATION RATE: 16 BRPM | WEIGHT: 153.4 LBS

## 2019-09-04 DIAGNOSIS — N95.1 SYMPTOMATIC MENOPAUSAL OR FEMALE CLIMACTERIC STATES: Primary | ICD-10-CM

## 2019-09-04 DIAGNOSIS — R63.5 WEIGHT GAIN: ICD-10-CM

## 2019-09-04 LAB
FSH SERPL-ACNC: 7.7 IU/L
TSH SERPL DL<=0.005 MIU/L-ACNC: 2.39 MU/L (ref 0.4–4)

## 2019-09-04 PROCEDURE — 99214 OFFICE O/P EST MOD 30 MIN: CPT | Performed by: OBSTETRICS & GYNECOLOGY

## 2019-09-04 PROCEDURE — 84443 ASSAY THYROID STIM HORMONE: CPT | Performed by: OBSTETRICS & GYNECOLOGY

## 2019-09-04 PROCEDURE — 36415 COLL VENOUS BLD VENIPUNCTURE: CPT | Performed by: OBSTETRICS & GYNECOLOGY

## 2019-09-04 PROCEDURE — 83001 ASSAY OF GONADOTROPIN (FSH): CPT | Performed by: OBSTETRICS & GYNECOLOGY

## 2019-09-04 ASSESSMENT — MIFFLIN-ST. JEOR: SCORE: 1305.82

## 2019-09-04 ASSESSMENT — PAIN SCALES - GENERAL: PAINLEVEL: NO PAIN (0)

## 2019-09-04 NOTE — PROGRESS NOTES
Subjective: She has 2 concerns.  The first is that she is developing menopausal symptoms with hot flashes.  Also she has had some lighter menses.    The second concern is that she has been gaining some weight recently.  She may be eating more.  She wants her thyroid checked      The past medical history, social history, past surgical history and family history as shown below have been reviewed by me today.    Past Medical History:   Diagnosis Date     Endometriosis, site unspecified      Female infertility of unspecified origin         Allergies   Allergen Reactions     Benadryl [Diphenhydramine] Hives     Zithromax [Azithromycin Dihydrate]      Hives, unknown if from Zithromax.  Could have been new face cream or new type of popcorn she had that day      Current Outpatient Medications   Medication Sig Dispense Refill     Multiple Vitamins-Minerals (MULTIVITAL PO)        Past Surgical History:   Procedure Laterality Date     C  DELIVERY ONLY  2003    , Low Cervical     Social History     Socioeconomic History     Marital status:      Spouse name: None     Number of children: None     Years of education: None     Highest education level: None   Occupational History     None   Social Needs     Financial resource strain: None     Food insecurity:     Worry: None     Inability: None     Transportation needs:     Medical: None     Non-medical: None   Tobacco Use     Smoking status: Never Smoker     Smokeless tobacco: Never Used   Substance and Sexual Activity     Alcohol use: Yes     Comment: occ     Drug use: No     Sexual activity: Yes     Partners: Male   Lifestyle     Physical activity:     Days per week: None     Minutes per session: None     Stress: None   Relationships     Social connections:     Talks on phone: None     Gets together: None     Attends Church service: None     Active member of club or organization: None     Attends meetings of clubs or organizations: None      "Relationship status: None     Intimate partner violence:     Fear of current or ex partner: None     Emotionally abused: None     Physically abused: None     Forced sexual activity: None   Other Topics Concern     Parent/sibling w/ CABG, MI or angioplasty before 65F 55M? Not Asked   Social History Narrative     None     Family History   Problem Relation Age of Onset     Lipids Mother         high cholesterol     Hypertension Father      Heart Disease Father         CAD       ROS: A 12 point review of systems was done. Except for what is listed above in the HPI, the systems review is negative .      Objective: Vital signs: Blood pressure 102/66, pulse 80, temperature 98.3  F (36.8  C), temperature source Temporal, resp. rate 16, height 1.626 m (5' 4\"), weight 69.6 kg (153 lb 6.4 oz), last menstrual period 07/18/2019, SpO2 99 %, not currently breastfeeding.  HEENT normal  Neck is supple, mobile, no adenopathy or masses palpable. The thyroid feels normal.   Normal range of motion noted.  Chest is clear to auscultation.  No wheezes, rales or rhonchi heard.  Cardiac exam is normal with s1, s2, no murmurs or adventitious sounds.Normal rate and rhythm is heard.   Abdomen is Soft and nontender.  No masses felt.  Pelvic exam: Bimanual exam was done with my nurse Manju present.  I do not feel any pelvic masses.  Uterus feels normal.  No adnexal masses noted.  She is nontender to palpation.        Assessment/Plan: A total of 25 minutes were spent face-to-face with this patient during today's consultation, with more than 50% of that time devoted to conversation and counseling about the management decisions.      1.  She has menopausal symptoms.Hormone replacement issues discussed. With estrogen use, risks  include but are not limited to risk of breast cancer, deep vein thrombosis, and possible coronary artery disease, but benefits that offset those risks include less bone calcium loss, improvement of hot flashes and vaginal " dryness.She has decided to think about it awhile and  she will let me know later.        2.  She has had some recent weight gain.  I will check TSH today.  She is wondering about the pros and cons of doing a Ca1 25 test.  I told her it is not a reliable screening test for ovarian cancer and most insurances do not cover it but I would order it if she wants.  After discussing it further she declined.  We also discussed the possibility of doing a pelvic ultrasound to screen for ovarian cancer.  At this point she declined.    3. rechekc for physical exam sometime in the next few months- including mammogram, etc. I strongly advised her to get a mammogram scheduled and she declined today but she will think about it.         The above information was dictating using Dragon voice software and as a result there may be some irregularities that were not detected in my review of this note.    JANNET Marquez MD

## 2019-09-09 NOTE — RESULT ENCOUNTER NOTE
Manju/Trent Nava,Please inform Gisel/ or caretaker  that this result(s) is/are normal.  Thyroid is normal.  The FSH level suggests that her ovaries are still going to work for a while but I would suggest that she consider the estrogen anyway based on her symptoms.  Thanks. JANNET Marquez MD

## 2019-12-08 ENCOUNTER — HEALTH MAINTENANCE LETTER (OUTPATIENT)
Age: 49
End: 2019-12-08

## 2020-03-15 ENCOUNTER — HEALTH MAINTENANCE LETTER (OUTPATIENT)
Age: 50
End: 2020-03-15

## 2020-04-29 ENCOUNTER — OFFICE VISIT (OUTPATIENT)
Dept: FAMILY MEDICINE | Facility: CLINIC | Age: 50
End: 2020-04-29
Payer: COMMERCIAL

## 2020-04-29 ENCOUNTER — NURSE TRIAGE (OUTPATIENT)
Dept: FAMILY MEDICINE | Facility: CLINIC | Age: 50
End: 2020-04-29

## 2020-04-29 VITALS
SYSTOLIC BLOOD PRESSURE: 132 MMHG | BODY MASS INDEX: 27.31 KG/M2 | HEART RATE: 82 BPM | WEIGHT: 160 LBS | OXYGEN SATURATION: 100 % | RESPIRATION RATE: 20 BRPM | DIASTOLIC BLOOD PRESSURE: 80 MMHG | HEIGHT: 64 IN | TEMPERATURE: 98.4 F

## 2020-04-29 DIAGNOSIS — I49.9 CARDIAC ARRHYTHMIA, UNSPECIFIED CARDIAC ARRHYTHMIA TYPE: Primary | ICD-10-CM

## 2020-04-29 PROCEDURE — 99213 OFFICE O/P EST LOW 20 MIN: CPT | Performed by: INTERNAL MEDICINE

## 2020-04-29 ASSESSMENT — MIFFLIN-ST. JEOR: SCORE: 1330.76

## 2020-04-29 ASSESSMENT — PAIN SCALES - GENERAL: PAINLEVEL: NO PAIN (0)

## 2020-04-29 NOTE — PROGRESS NOTES
Subjective     Gisel Gauthier is a 49 year old female who presents to clinic today for the following health issues:    HPI   Chief Complaint   Patient presents with     Palpitations     3 weeks, 10-15 times daily, shortness of breath, tingling in arms, mostly left . Noticed when sitting.                         Chief Complaint         The patient is a pleasant 49-year-old female who for the last 3 weeks has been having cardiac arrhythmia.  She notes that it is intermittent, up to 15 times per day that she notices it, slightly more prevalent in the evenings when she is resting, and sometimes associated with some tingling in her left arm but that is extremely modest.  She is had no shortness of breath, no chest pain, no diaphoresis.  She notes minimal caffeine usage and is on no decongestants.  She is very busy at work and having no concerns regarding her employment.  She does have the usual baseline concerns regarding the current pandemic.  No prior cardiac history is noted.  She takes no medications.                         PAST, FAMILY,SOCIAL HISTORY:     Medical  History:   has a past medical history of Endometriosis, site unspecified and Female infertility of unspecified origin.     Surgical History:   has a past surgical history that includes  DELIVERY ONLY (2003).     Social History:   reports that she has never smoked. She has never used smokeless tobacco. She reports current alcohol use. She reports that she does not use drugs.     Family History:  family history includes Heart Disease in her father; Hypertension in her father; Lipids in her mother.            MEDICATIONS  Current Outpatient Medications   Medication Sig Dispense Refill     Multiple Vitamins-Minerals (MULTIVITAL PO)            --------------------------------------------------------------------------------------------------------------------                              Review of Systems       LUNGS: Pt denies: cough, excess  "sputum, hemoptysis, or shortness of breath.   HEART: Pt denies: chest pain,  syncope, tachy or bradyarrhythmia.   GI: Pt denies: nausea, vomiting, diarrhea, constipation, melena, or hematochezia.   NEURO: Pt denies: seizures, strokes, diplopia, weakness, paraesthesias, or paralysis.   SKIN: Pt denies: itching, rashes, discoloration, or specific lesions of concern. Denies recent hair loss.   PSYCH: The patient denies significant depression, anxiety, mood imbalance. Specifically denies any suicidal ideation.        Examination    /80 (BP Location: Right arm, Patient Position: Sitting, Cuff Size: Adult Regular)   Pulse 82   Temp 98.4  F (36.9  C) (Temporal)   Resp 20   Ht 1.626 m (5' 4\")   Wt 72.6 kg (160 lb)   SpO2 100%   BMI 27.46 kg/m      Constitutional: The patient appears to be in no acute distress. The patient appears to be adequately hydrated. No acute respiratory or hemodynamic distress is noted at this time.   LUNGS: clear bilaterally, airflow is brisk, no intercostal retraction or stridor is noted. No coughing is noted during visit.   HEART:  regular without rubs, clicks, gallops, or murmurs. PMI is nondisplaced. Upstrokes are brisk. S1,S2 are heard.  No arrhythmias noted at this time.   GI: Abdomen is soft, without rebound, guarding or tenderness. Bowel sounds are appropriate. No renal bruits are heard.   NEURO: Pt is alert and appropriate. No neurologic lateralization is noted. Cranial nerves 2-12 are intact. Peripheral sensory and motor function are grossly normal.    SKIN:  warm and dry. No erythema, or rashes are noted. No specific lesions of concern are noted.    PSYCH: The patient appears grossly appropriate. Maintains good eye contact, does not have any jittery or atypical motion. Displays appropriate affect.         Decision Making       1. Cardiac arrhythmia, unspecified cardiac arrhythmia type  We will set up a Holter monitor to better determine whether she has ventricular or " supraventricular extrasystoles.  Discussed suppressive therapy with beta-blockers though patient notes symptoms are not at this time significant enough.  Recommend avoiding caffeine, nicotine, decongestants etc.  - Holter Monitor 48 hour Adult Pediatric; Future                              FOLLOW UP   I have asked the patient to make an appointment for followup with me when results are available we will review these over the phone and discuss treatment options at her wishes.                I have carefully explained the diagnosis and treatment options to the patient.  The patient has displayed an understanding of the above, and all subsequent questions were answered.      DO LAMAR Sanderson    Portions of this note were produced using [employer]  Although every attempt at real-time proof reading has been made, occasional grammar/syntax errors may have been missed.

## 2020-04-29 NOTE — TELEPHONE ENCOUNTER
"    Reason for Disposition    [1] Palpitations AND [2] no improvement after using CARE ADVICE    Additional Information    Negative: Difficulty breathing    Negative: Dizziness, lightheadedness, or weakness    Negative: [1] Heart beating very rapidly (e.g., > 140 / minute) AND [2] present now  (Exception: during exercise)    Negative: Heart beating very slowly (e.g., < 50 / minute)  (Exception: athlete)    Negative: Chest pain    Negative: New or worsened shortness of breath with activity (dyspnea on exertion)    Negative: Patient sounds very sick or weak to the triager    Negative: [1] Heart beating very rapidly (e.g., > 140 / minute) AND [2] not present now  (Exception: during exercise)    Negative: [1] Skipped or extra beat(s) AND [2] increases with exercise or exertion    Negative: [1] Skipped or extra beat(s) AND [2] occurs 4 or more times per minute    Negative: New or worsened ankle swelling    Negative: History of heart disease  (i.e., heart attack, bypass surgery, angina, angioplasty, CHF) (Exception: brief heart beat symptoms that went away and now feels well)    Negative: Age > 60 years (Exception: brief heart beat symptoms that went away and now feels well)    Negative: Taking water pill (i.e., diuretic) or heart medication (e.g., digoxin)    Answer Assessment - Initial Assessment Questions  1. DESCRIPTION: \"Please describe your heart rate or heart beat that you are having\" (e.g., fast/slow, regular/irregular, skipped or extra beats, \"palpitations\")      palpitations  2. ONSET: \"When did it start?\" (Minutes, hours or days)       Been going on for weeks  3. DURATION: \"How long does it last\" (e.g., seconds, minutes, hours)      Maybe 1- 1/2 minutes.  10-15 times a day  4. PATTERN \"Does it come and go, or has it been constant since it started?\"  \"Does it get worse with exertion?\"   \"Are you feeling it now?\"      Comes and goes. Worse with sitting.  Not feeling it now  5. TAP: \"Using your hand, can you tap " "out what you are feeling on a chair or table in front of you, so that I can hear?\" (Note: not all patients can do this)           6. HEART RATE: \"Can you tell me your heart rate?\" \"How many beats in 15 seconds?\"  (Note: not all patients can do this)        Around 88 when going on.  Resting 68 with no symtoms  7. RECURRENT SYMPTOM: \"Have you ever had this before?\" If so, ask: \"When was the last time?\" and \"What happened that time?\"       Had a stress test with [facility] but dont remember the symptoms I was having.  8. CAUSE: \"What do you think is causing the palpitations?\"         9. CARDIAC HISTORY: \"Do you have any history of heart disease?\" (e.g., heart attack, angina, bypass surgery, angioplasty, arrhythmia)       no  10. OTHER SYMPTOMS: \"Do you have any other symptoms?\" (e.g., dizziness, chest pain, sweating, difficulty breathing)        Maybe chest tightness-like a rubberband around. Tingle in arms sometimes when it happens.  11. PREGNANCY: \"Is there any chance you are pregnant?\" \"When was your last menstrual period?\"        no    Protocols used: HEART RATE AND HEARTBEAT [id number]      "

## 2020-05-06 ENCOUNTER — HOSPITAL ENCOUNTER (OUTPATIENT)
Dept: CARDIOLOGY | Facility: CLINIC | Age: 50
Discharge: HOME | End: 2020-05-06
Attending: INTERNAL MEDICINE | Admitting: INTERNAL MEDICINE
Payer: COMMERCIAL

## 2020-05-06 DIAGNOSIS — I49.9 CARDIAC ARRHYTHMIA, UNSPECIFIED CARDIAC ARRHYTHMIA TYPE: ICD-10-CM

## 2020-05-06 PROCEDURE — 93226 XTRNL ECG REC<48 HR SCAN A/R: CPT

## 2020-05-06 PROCEDURE — 93227 XTRNL ECG REC<48 HR R&I: CPT | Performed by: INTERNAL MEDICINE

## 2020-05-15 NOTE — RESULT ENCOUNTER NOTE
"Dear Gisel, your recent test results are attached.  Approximately 5% of your heartbeats are what are called \"supraventricular extrasystoles\".  This is just a very fancy way of saying a slightly early heartbeat.  They are harmless and amount to no harm.  I would be hesitant to treat these, though if they are extremely inconveniencing, we could initiate therapy.  I would recommend the avoidance of caffeine, decongestants and other stimulants.  This will sometimes improve the problem.  Anxiety and nervousness (adrenaline) can also worsen the frequency of these extra heartbeats.  But overall, they are harmless.    You will be contacted with any outstanding results when they are available.  Feel free to contact me via the office or My Chart if you have any questions regarding the above.  Sincerely,  Heriberto Pike, DO FACOI"

## 2020-05-18 ENCOUNTER — TELEPHONE (OUTPATIENT)
Dept: FAMILY MEDICINE | Facility: CLINIC | Age: 50
End: 2020-05-18

## 2020-05-18 NOTE — TELEPHONE ENCOUNTER
"Notes recorded by Heriberto Pike DO on 5/15/2020 at 8:02 AM CDT       Dear Gisel, your recent test results are attached.   Approximately 5% of your heartbeats are what are called \"supraventricular extrasystoles\".  This is just a very fancy way of saying a slightly early heartbeat.  They are harmless and amount to no harm.   I would be hesitant to treat these, though if they are extremely inconveniencing, we could initiate therapy.   I would recommend the avoidance of caffeine, decongestants and other stimulants.  This will sometimes improve the problem.   Anxiety and nervousness (adrenaline) can also worsen the frequency of these extra heartbeats.   But overall, they are harmless.     You will be contacted with any outstanding results when they are available.   Feel free to contact me via the office or My Chart if you have any questions regarding the above.   Sincerely,  Heriberto Pike DO FACOI   "

## 2020-05-18 NOTE — TELEPHONE ENCOUNTER
Reason for Call:  Request for results:    Name of test or procedure: holter montitor    Date of test of procedure: 05/06/20 throught 05/07/20    Location of the test or procedure: [facility] to leave the result message on voice mail or with a family member? NO    Phone number Patient can be reached at:  Work number on file: [phone]      Additional comments: Please call Gisel with her test results    Call taken on 5/18/2020 at 7:59 AM by Brandi Rivero

## 2020-05-18 NOTE — TELEPHONE ENCOUNTER
Spoke with patient and informed of message below. Patient understood, no further questions or concerns at this time.     Britta Grubbs MA

## 2021-01-09 ENCOUNTER — HEALTH MAINTENANCE LETTER (OUTPATIENT)
Age: 51
End: 2021-01-09

## 2021-05-08 ENCOUNTER — HEALTH MAINTENANCE LETTER (OUTPATIENT)
Age: 51
End: 2021-05-08

## 2021-10-23 ENCOUNTER — HEALTH MAINTENANCE LETTER (OUTPATIENT)
Age: 51
End: 2021-10-23

## 2021-11-22 ASSESSMENT — ENCOUNTER SYMPTOMS
ABDOMINAL PAIN: 0
DYSURIA: 0
FEVER: 0
DIARRHEA: 0
HEADACHES: 1
CHILLS: 0
PARESTHESIAS: 0
FREQUENCY: 0
PALPITATIONS: 0
HEARTBURN: 0
HEMATURIA: 0
MYALGIAS: 0
WEAKNESS: 0
JOINT SWELLING: 0
EYE PAIN: 0
COUGH: 0
BREAST MASS: 0
NAUSEA: 0
SORE THROAT: 0
HEMATOCHEZIA: 0
CONSTIPATION: 0
DIZZINESS: 0
SHORTNESS OF BREATH: 0
NERVOUS/ANXIOUS: 0
ARTHRALGIAS: 1

## 2021-11-23 ENCOUNTER — OFFICE VISIT (OUTPATIENT)
Dept: FAMILY MEDICINE | Facility: CLINIC | Age: 51
End: 2021-11-23
Payer: COMMERCIAL

## 2021-11-23 ENCOUNTER — HOSPITAL ENCOUNTER (OUTPATIENT)
Dept: MAMMOGRAPHY | Facility: CLINIC | Age: 51
Discharge: HOME | End: 2021-11-23
Attending: OBSTETRICS & GYNECOLOGY | Admitting: OBSTETRICS & GYNECOLOGY
Payer: COMMERCIAL

## 2021-11-23 DIAGNOSIS — R33.9 URINARY RETENTION: ICD-10-CM

## 2021-11-23 DIAGNOSIS — Z00.00 WELL ADULT EXAM: Primary | ICD-10-CM

## 2021-11-23 DIAGNOSIS — Z12.31 VISIT FOR SCREENING MAMMOGRAM: ICD-10-CM

## 2021-11-23 DIAGNOSIS — N95.1 SYMPTOMATIC MENOPAUSAL OR FEMALE CLIMACTERIC STATES: ICD-10-CM

## 2021-11-23 LAB
ALBUMIN UR-MCNC: NEGATIVE MG/DL
APPEARANCE UR: CLEAR
BILIRUB UR QL STRIP: NEGATIVE
COLOR UR AUTO: YELLOW
GLUCOSE UR STRIP-MCNC: NEGATIVE MG/DL
HGB UR QL STRIP: NEGATIVE
KETONES UR STRIP-MCNC: 5 MG/DL
LEUKOCYTE ESTERASE UR QL STRIP: NEGATIVE
NITRATE UR QL: NEGATIVE
PH UR STRIP: 5 [PH] (ref 5–7)
SP GR UR STRIP: 1.02 (ref 1–1.03)
UROBILINOGEN UR STRIP-MCNC: NORMAL MG/DL

## 2021-11-23 PROCEDURE — 99396 PREV VISIT EST AGE 40-64: CPT | Performed by: OBSTETRICS & GYNECOLOGY

## 2021-11-23 PROCEDURE — 81003 URINALYSIS AUTO W/O SCOPE: CPT | Performed by: OBSTETRICS & GYNECOLOGY

## 2021-11-23 PROCEDURE — 77063 BREAST TOMOSYNTHESIS BI: CPT

## 2021-11-23 RX ORDER — MEDROXYPROGESTERONE ACETATE 5 MG
5 TABLET ORAL DAILY
Qty: 90 TABLET | Refills: 3 | Status: SHIPPED | OUTPATIENT
Start: 2021-11-23 | End: 2022-05-12

## 2021-11-23 RX ORDER — ESTRADIOL 2 MG/1
2 TABLET ORAL DAILY
Qty: 90 TABLET | Refills: 3 | Status: SHIPPED | OUTPATIENT
Start: 2021-11-23 | End: 2022-05-12

## 2021-11-23 NOTE — PROGRESS NOTES
Subjective:Gisel is here for yearly physical. Current concerns are: Some urinary retention and also some menopausal symptoms with vaginal dryness.  Would like to consider hormone replacement.  We have discussed the risks and benefits previously        Past Medical History:   Diagnosis Date     Endometriosis, site unspecified      Female infertility of unspecified origin       Current Outpatient Medications   Medication Sig Dispense Refill     estradiol (ESTRACE) 2 MG tablet Take 1 tablet (2 mg) by mouth daily 90 tablet 3     medroxyPROGESTERone (PROVERA) 5 MG tablet Take 1 tablet (5 mg) by mouth daily 90 tablet 3     Multiple Vitamins-Minerals (MULTIVITAL PO)         Allergies   Allergen Reactions     Benadryl [Diphenhydramine] Hives     Zithromax [Azithromycin Dihydrate]      Hives, unknown if from Zithromax.  Could have been new face cream or new type of popcorn she had that day       History   Smoking Status     Never Smoker   Smokeless Tobacco     Never Used      Past Surgical History:   Procedure Laterality Date     C  DELIVERY ONLY  2003    , Low Cervical      Social History     Tobacco Use     Smoking status: Never Smoker     Smokeless tobacco: Never Used   Substance Use Topics     Alcohol use: Yes     Comment: occ     Drug use: No      Family History   Problem Relation Age of Onset     Lipids Mother         high cholesterol     Hypertension Father      Heart Disease Father         CAD       ROS: A 12 point review of systems is negative except for the following:  See above      Physical Exam: 118/78-68-16-  HEENT:    Sclerae and conjunctiva are normal.   Ear canals and TMs look normal.  Nasal mucosa is pink  - no polyps or masses seen.  Throat is unremarkable . Mucous membranes are moist.   Neck is supple, mobile, no adenopathy or masses palpable. The thyroid feels normal.   Normal range of motion noted.  Chest is clear to auscultation.  No wheezes, rales or rhonchi heard.  Cardiac exam  is normal with s1, s2, no murmurs or adventitious sounds.Normal rate and rhythm is heard.   Abdomen is soft,  nondistended, No masses felt.No HSM. No guarding or rigidity or rebound   noted. Palpation reveals  no    tenderness   Normal bowel sounds heard.    Pelvic exam:My nurse Mary   was present to chaperone the exam.  The external genitalia appeared normal.    The vaginal vault was without bleeding  or   discharge   or odor.   The cervix was smooth   and shiny and normal in appearance.    No vaginal support defects were noted except a very subtle cystocele and laxity of the bladder neck.  Bimanual exam revealed a 6 week   sized uterus. It does not   descend   well in the vaginal vault.    No adnexal masses were felt.    There was no  cervical motion tenderness.            The breast exam was declined.      We did discuss the option for an exam   and I suggested that she should be doing a self-breast exam   monthly and after the age of 40 a yearly mammogram   and she feels comfortable that this is sufficient.             Assessment/Plan:    The yearly exam is normal except for 1.  Symptomatic menopausal state-we discussed pros and cons of using hormone replacement.  Including risks of breast cancer.  See Rx.    Urinary retention symptoms.  This is intermittent.  Will check urinalysis today.  If these persist then will refer to urology for consideration of cystoscopy.      Additional Plan:Diet and rest and exercise discussed.      I have advised going for a 5 mile walk daily if possible       See labs and orders.    .Immunizations reviewed(TDAP, pneumovax, shingles vaccine,etc)and discussed-including advice for yearly flu shot/tetanus update.     The following vaccines given today: He declined any.    Hepatitis C and HIV testing offered    these tests were declined.     Calcium supplementation advised     Colonoscopy at age 50  - we discussed this and she declined.    Mammogram  Is advised beginning at age 40-pt to  be responsible for getting this done     DEXA is advised beginning at age 65      Labs done: see orders I ordered a cholesterol but she declined it so I canceled it.  I advised the following exams with specialists:    1. Dental evaluation yearly    2. Dermatology evaluation for mole exam yearly    3. Ophthalmology exam yearly to check for glaucoma, etc          Living will was discussed deviously       Followup in 12   months, sooner if concerns arise.   JANNET Mraquez MD(electronic signature)

## 2021-11-24 ENCOUNTER — NURSE TRIAGE (OUTPATIENT)
Dept: NURSING | Facility: CLINIC | Age: 51
End: 2021-11-24
Payer: COMMERCIAL

## 2021-11-24 NOTE — TELEPHONE ENCOUNTER
Stated that she looked up on the Internet about the ketone in the urine which showed a red flag; she was concerned.  She had a UA done yesterday for feeling like unable to empty her bladder.  Doing well today    Went over the lab result with debi.  No sign of UTI.  Discussed with her about the significance of ketone level in the urine.  It is minimally elevated, most likely insignificant.  Will monitor for now.  Encouraged to drink a lot of water.  She felt comfortable with the plan and all of her questions were answered.    Neal Min MD.

## 2021-11-24 NOTE — TELEPHONE ENCOUNTER
Gisel was in yesterday and seen by Everett Rodríguez yesterday and noticed that her ketone levels were elevated in her lab results.  Gisel is requesting to speak with Everett Rodríguez regarding her lab results.  Please phone Gisel.    COVID 19 Nurse Triage Plan/Patient Instructions    Please be aware that novel coronavirus (COVID-19) may be circulating in the community. If you develop symptoms such as fever, cough, or SOB or if you have concerns about the presence of another infection including coronavirus (COVID-19), please contact your health care provider or visit [email or web address].     Disposition/Instructions    Home care recommended. Follow home care protocol based instructions.    Thank you for taking steps to prevent the spread of this virus.  o Limit your contact with others.  o Wear a simple mask to cover your cough.  o Wash your hands well and often.    Resources    [facility]: About COVID-19: [email or web address]    CDC: What to Do If You're Sick: www.cdc.gov/coronavirus/2019-ncov/about/steps-when-sick.html    CDC: Ending Home Isolation: www.cdc.gov/coronavirus/2019-ncov/hcp/disposition-in-home-patients.html     CDC: Caring for Someone: www.cdc.gov/coronavirus/2019-ncov/if-you-are-sick/care-for-someone.html     [facility]: Interim Guidance for Hospital Discharge to Home: www.health.[facility].[place]/diseases/coronavirus/hcp/hospdischarge.pdf    [facility] clinical trials (COVID-19 research studies): [email or web address]-clinical-trials     Below are the COVID-19 hotlines at the [place] [facility] of [facility] ([facility]). Interpreters are available.   o For health questions: Call 955-645-2929 or 1-833.469.8649 (7 a.m. to 7 p.m.)  o For questions about schools and childcare: Call 887-936-7160 or 1-596.416.8135 (7 a.m. to 7 p.m.)

## 2022-05-12 ENCOUNTER — OFFICE VISIT (OUTPATIENT)
Dept: FAMILY MEDICINE | Facility: CLINIC | Age: 52
End: 2022-05-12
Payer: COMMERCIAL

## 2022-05-12 VITALS
HEART RATE: 79 BPM | TEMPERATURE: 98.1 F | WEIGHT: 163 LBS | OXYGEN SATURATION: 100 % | DIASTOLIC BLOOD PRESSURE: 68 MMHG | SYSTOLIC BLOOD PRESSURE: 126 MMHG | BODY MASS INDEX: 27.98 KG/M2

## 2022-05-12 DIAGNOSIS — R10.11 RUQ ABDOMINAL PAIN: Primary | ICD-10-CM

## 2022-05-12 LAB
ALBUMIN SERPL-MCNC: 4.2 G/DL (ref 3.4–5)
ALBUMIN UR-MCNC: NEGATIVE MG/DL
ALP SERPL-CCNC: 66 U/L (ref 40–150)
ALT SERPL W P-5'-P-CCNC: 25 U/L (ref 0–50)
ANION GAP SERPL CALCULATED.3IONS-SCNC: 3 MMOL/L (ref 3–14)
APPEARANCE UR: CLEAR
AST SERPL W P-5'-P-CCNC: 16 U/L (ref 0–45)
BASOPHILS # BLD AUTO: 0.1 10E3/UL (ref 0–0.2)
BASOPHILS NFR BLD AUTO: 1 %
BILIRUB SERPL-MCNC: 0.5 MG/DL (ref 0.2–1.3)
BILIRUB UR QL STRIP: NEGATIVE
BUN SERPL-MCNC: 16 MG/DL (ref 7–30)
CALCIUM SERPL-MCNC: 8.9 MG/DL (ref 8.5–10.1)
CHLORIDE BLD-SCNC: 108 MMOL/L (ref 94–109)
CO2 SERPL-SCNC: 29 MMOL/L (ref 20–32)
COLOR UR AUTO: ABNORMAL
CREAT SERPL-MCNC: 0.6 MG/DL (ref 0.52–1.04)
EOSINOPHIL # BLD AUTO: 0.2 10E3/UL (ref 0–0.7)
EOSINOPHIL NFR BLD AUTO: 3 %
ERYTHROCYTE [DISTWIDTH] IN BLOOD BY AUTOMATED COUNT: 12.6 % (ref 10–15)
GFR SERPL CREATININE-BSD FRML MDRD: >90 ML/MIN/1.73M2
GLUCOSE BLD-MCNC: 106 MG/DL (ref 70–99)
GLUCOSE UR STRIP-MCNC: NEGATIVE MG/DL
HCT VFR BLD AUTO: 38.9 % (ref 35–47)
HGB BLD-MCNC: 13 G/DL (ref 11.7–15.7)
HGB UR QL STRIP: NEGATIVE
IMM GRANULOCYTES # BLD: 0 10E3/UL
IMM GRANULOCYTES NFR BLD: 0 %
KETONES UR STRIP-MCNC: NEGATIVE MG/DL
LEUKOCYTE ESTERASE UR QL STRIP: NEGATIVE
LYMPHOCYTES # BLD AUTO: 2.4 10E3/UL (ref 0.8–5.3)
LYMPHOCYTES NFR BLD AUTO: 28 %
MCH RBC QN AUTO: 31.7 PG (ref 26.5–33)
MCHC RBC AUTO-ENTMCNC: 33.4 G/DL (ref 31.5–36.5)
MCV RBC AUTO: 95 FL (ref 78–100)
MONOCYTES # BLD AUTO: 0.8 10E3/UL (ref 0–1.3)
MONOCYTES NFR BLD AUTO: 9 %
NEUTROPHILS # BLD AUTO: 5.1 10E3/UL (ref 1.6–8.3)
NEUTROPHILS NFR BLD AUTO: 59 %
NITRATE UR QL: NEGATIVE
NRBC # BLD AUTO: 0 10E3/UL
NRBC BLD AUTO-RTO: 0 /100
PH UR STRIP: 8 [PH] (ref 5–7)
PLATELET # BLD AUTO: 251 10E3/UL (ref 150–450)
POTASSIUM BLD-SCNC: 3.7 MMOL/L (ref 3.4–5.3)
PROT SERPL-MCNC: 7.5 G/DL (ref 6.8–8.8)
RBC # BLD AUTO: 4.1 10E6/UL (ref 3.8–5.2)
SODIUM SERPL-SCNC: 140 MMOL/L (ref 133–144)
SP GR UR STRIP: 1 (ref 1–1.03)
UROBILINOGEN UR STRIP-MCNC: NORMAL MG/DL
WBC # BLD AUTO: 8.6 10E3/UL (ref 4–11)

## 2022-05-12 PROCEDURE — 80053 COMPREHEN METABOLIC PANEL: CPT | Performed by: FAMILY MEDICINE

## 2022-05-12 PROCEDURE — 99214 OFFICE O/P EST MOD 30 MIN: CPT | Performed by: FAMILY MEDICINE

## 2022-05-12 PROCEDURE — 36415 COLL VENOUS BLD VENIPUNCTURE: CPT | Performed by: FAMILY MEDICINE

## 2022-05-12 PROCEDURE — 85025 COMPLETE CBC W/AUTO DIFF WBC: CPT | Performed by: FAMILY MEDICINE

## 2022-05-12 PROCEDURE — 81003 URINALYSIS AUTO W/O SCOPE: CPT | Performed by: FAMILY MEDICINE

## 2022-05-12 ASSESSMENT — ENCOUNTER SYMPTOMS: NUMBNESS: 1

## 2022-05-12 ASSESSMENT — PAIN SCALES - GENERAL: PAINLEVEL: NO PAIN (0)

## 2022-05-12 NOTE — PROGRESS NOTES
Assessment & Plan     RUQ abdominal pain  The bottom line is she has some right upper quadrant pain that radiates to the back.  Comes and goes.  Can be sharp.  Did have an emesis and one of the episodes.  So at the time of this dictation results of the ultrasound are back.  Does show gallstone.  Are going to set her up for consult with general surgery.  We will let her know the results of the ultrasound.  Her labs were all okay.  - REVIEW OF HEALTH MAINTENANCE PROTOCOL ORDERS  - UA Macro with Reflex to Micro and Culture - lab collect; Future  - CBC with platelets and differential; Future  - Comprehensive metabolic panel (BMP + Alb, Alk Phos, ALT, AST, Total. Bili, TP); Future  - US Abdomen Complete; Future  - UA Macro with Reflex to Micro and Culture - lab collect  - CBC with platelets and differential  - Comprehensive metabolic panel (BMP + Alb, Alk Phos, ALT, AST, Total. Bili, TP)                 No follow-ups on file.    Johann Stephen MD  [facility]    Kamari Batres is a 51 year old who presents for the following health issues : This was put in as a visit for numbness and tingling and back pain but it turns out its epigastric pain that she has with some radiation to her back.  Been going on on and off for over a month.  Denies any dysuria.  Denies any changes in her stools.  No fevers.    Numbness  Associated symptoms include numbness.   History of Present Illness       Back Pain:  She presents for follow up of back pain. Patient's back pain is a new problem.    Original cause of back pain: not sure  First noticed back pain: more than 1 month ago  Patient feels back pain: comes and goesLocation of back pain:  Right middle of back  Description of back pain: gnawing and sharp  Back pain spreads: nowhere    Since patient first noticed back pain, pain is: always present, but gets better and worse  Does back pain interfere with her job:  No  On a scale of 1-10 (10 being the worst),  patient describes pain as:  4  What makes back pain worse: lying down and sitting  Acupuncture: not tried  Acetaminophen: helpful  Activity or exercise: not helpful  Chiropractor:  Not tried  Cold: not tried  Heat: not tried  Massage: not tried  Muscle relaxants: helpful  NSAIDS: not tried  Opioids: not tried  Physical Therapy: not tried  Rest: not helpful  Steroid Injection: not tried  Stretching: not tried  Surgery: not tried  TENS unit: not tried  Topical pain relievers: not tried  Other healthcare providers patient is seeing for back pain: None    Reason for visit:  Have had 2 episodes were I have sudden severe pain in back and lower right side.  Symptom onset:  More than a month  Symptoms include:  Sharp pain, can't function comes on suddenly and goes away suddenly  Symptom intensity:  Severe  Symptom progression:  Staying the same  Had these symptoms before:  No  What makes it better:  Throwing up the first episode, 2nd episode it just went away on own.    She eats 2-3 servings of fruits and vegetables daily.She consumes 2 sweetened beverage(s) daily.She exercises with enough effort to increase her heart rate 30 to 60 minutes per day.  She exercises with enough effort to increase her heart rate 4 days per week.   She is taking medications regularly.       Numbness and tingling through out body, started in the middle of April, usually worse in the morning        Review of Systems   Neurological: Positive for numbness.      Constitutional, HEENT, cardiovascular, pulmonary, gi and gu systems are negative, except as otherwise noted.      Objective    /68   Pulse 79   Temp 98.1  F (36.7  C) (Temporal)   Wt 73.9 kg (163 lb)   SpO2 100%   BMI 27.98 kg/m    Body mass index is 27.98 kg/m .  Physical Exam   GENERAL: healthy, alert and no distress  EYES: Eyes grossly normal to inspection, PERRL and conjunctivae and sclerae normal  NECK: no adenopathy, no asymmetry, masses, or scars and thyroid normal to  palpation  RESP: lungs clear to auscultation - no rales, rhonchi or wheezes  CV: regular rate and rhythm, normal S1 S2, no S3 or S4, no murmur, click or rub, no peripheral edema and peripheral pulses strong  ABDOMEN: Some right upper quadrant tenderness.  No masses noted.  Bowel sounds present.  No distention.  MS: no gross musculoskeletal defects noted, no edema  SKIN: no suspicious lesions or rashes  BACK: no CVA tenderness, no paralumbar tenderness  PSYCH: mentation appears normal, affect normal/bright    Results for orders placed or performed in visit on 05/12/22   UA Macro with Reflex to Micro and Culture - lab collect     Status: Abnormal    Specimen: Urine, Midstream   Result Value Ref Range    Color Urine Straw Colorless, Straw, Light Yellow, Yellow    Appearance Urine Clear Clear    Glucose Urine Negative Negative mg/dL    Bilirubin Urine Negative Negative    Ketones Urine Negative Negative mg/dL    Specific Gravity Urine 1.004 1.003 - 1.035    Blood Urine Negative Negative    pH Urine 8.0 (H) 5.0 - 7.0    Protein Albumin Urine Negative Negative mg/dL    Urobilinogen Urine Normal Normal, 2.0 mg/dL    Nitrite Urine Negative Negative    Leukocyte Esterase Urine Negative Negative    Narrative    Microscopic not indicated   Comprehensive metabolic panel (BMP + Alb, Alk Phos, ALT, AST, Total. Bili, TP)     Status: Abnormal   Result Value Ref Range    Sodium 140 133 - 144 mmol/L    Potassium 3.7 3.4 - 5.3 mmol/L    Chloride 108 94 - 109 mmol/L    Carbon Dioxide (CO2) 29 20 - 32 mmol/L    Anion Gap 3 3 - 14 mmol/L    Urea Nitrogen 16 7 - 30 mg/dL    Creatinine 0.60 0.52 - 1.04 mg/dL    Calcium 8.9 8.5 - 10.1 mg/dL    Glucose 106 (H) 70 - 99 mg/dL    Alkaline Phosphatase 66 40 - 150 U/L    AST 16 0 - 45 U/L    ALT 25 0 - 50 U/L    Protein Total 7.5 6.8 - 8.8 g/dL    Albumin 4.2 3.4 - 5.0 g/dL    Bilirubin Total 0.5 0.2 - 1.3 mg/dL    GFR Estimate >90 >60 mL/min/1.73m2   CBC with platelets and differential      Status: None   Result Value Ref Range    WBC Count 8.6 4.0 - 11.0 10e3/uL    RBC Count 4.10 3.80 - 5.20 10e6/uL    Hemoglobin 13.0 11.7 - 15.7 g/dL    Hematocrit 38.9 35.0 - 47.0 %    MCV 95 78 - 100 fL    MCH 31.7 26.5 - 33.0 pg    MCHC 33.4 31.5 - 36.5 g/dL    RDW 12.6 10.0 - 15.0 %    Platelet Count 251 150 - 450 10e3/uL    % Neutrophils 59 %    % Lymphocytes 28 %    % Monocytes 9 %    % Eosinophils 3 %    % Basophils 1 %    % Immature Granulocytes 0 %    NRBCs per 100 WBC 0 <1 /100    Absolute Neutrophils 5.1 1.6 - 8.3 10e3/uL    Absolute Lymphocytes 2.4 0.8 - 5.3 10e3/uL    Absolute Monocytes 0.8 0.0 - 1.3 10e3/uL    Absolute Eosinophils 0.2 0.0 - 0.7 10e3/uL    Absolute Basophils 0.1 0.0 - 0.2 10e3/uL    Absolute Immature Granulocytes 0.0 <=0.4 10e3/uL    Absolute NRBCs 0.0 10e3/uL   CBC with platelets and differential     Status: None    Narrative    The following orders were created for panel order CBC with platelets and differential.  Procedure                               Abnormality         Status                     ---------                               -----------         ------                     CBC with platelets and d...[[id number]]                      Final result                 Please view results for these tests on the individual orders.

## 2022-05-16 ENCOUNTER — HOSPITAL ENCOUNTER (OUTPATIENT)
Dept: ULTRASOUND IMAGING | Facility: CLINIC | Age: 52
Discharge: HOME | End: 2022-05-16
Attending: FAMILY MEDICINE | Admitting: FAMILY MEDICINE
Payer: COMMERCIAL

## 2022-05-16 ENCOUNTER — TELEPHONE (OUTPATIENT)
Dept: FAMILY MEDICINE | Facility: CLINIC | Age: 52
End: 2022-05-16
Payer: COMMERCIAL

## 2022-05-16 DIAGNOSIS — R10.11 RUQ ABDOMINAL PAIN: ICD-10-CM

## 2022-05-16 PROCEDURE — 76700 US EXAM ABDOM COMPLETE: CPT

## 2022-05-16 NOTE — TELEPHONE ENCOUNTER
----- Message from Johann Stephen MD sent at 5/16/2022 11:27 AM CDT -----  Let her know her gallbladder does show gallstones currently not blocking anything but lets set her up for a consult with general surgery

## 2022-05-25 ENCOUNTER — OFFICE VISIT (OUTPATIENT)
Dept: SURGERY | Facility: CLINIC | Age: 52
End: 2022-05-25
Attending: FAMILY MEDICINE
Payer: COMMERCIAL

## 2022-05-25 ENCOUNTER — TELEPHONE (OUTPATIENT)
Dept: SURGERY | Facility: CLINIC | Age: 52
End: 2022-05-25

## 2022-05-25 VITALS
WEIGHT: 165 LBS | HEIGHT: 64 IN | DIASTOLIC BLOOD PRESSURE: 68 MMHG | SYSTOLIC BLOOD PRESSURE: 110 MMHG | TEMPERATURE: 99.1 F | BODY MASS INDEX: 28.17 KG/M2

## 2022-05-25 DIAGNOSIS — R10.11 RUQ ABDOMINAL PAIN: ICD-10-CM

## 2022-05-25 DIAGNOSIS — K80.20 SYMPTOMATIC CHOLELITHIASIS: Primary | ICD-10-CM

## 2022-05-25 PROCEDURE — 99204 OFFICE O/P NEW MOD 45 MIN: CPT | Performed by: SURGERY

## 2022-05-25 NOTE — LETTER
2022         RE: Gisel Gauthier  [id number] [facility] [id number]        Dear Colleague,    Thank you for referring your patient, Gisel Gauthier, to the [facility]. Please see a copy of my visit note below.    Patient seen in consultation for symptomatic cholelithiasis by Everett Marquez    HPI:  Patient is a 51 year old female with history of recurring post-prandial RUQ abdominal pain with nausea and vomiting. She also endorses loose stools. She had US which showed cholelithiasis and one large almost 3cm gallstone. She's had laparoscopy in the past and .     Review Of Systems    Skin: negative  Ears/Nose/Throat: negative  Respiratory: No shortness of breath, dyspnea on exertion, cough, or hemoptysis  Cardiovascular: negative  Gastrointestinal: as above  Genitourinary: negative  Musculoskeletal: negative  Neurologic: negative  Hematologic/Lymphatic/Immunologic: negative  Endocrine: negative      Past Medical History:   Diagnosis Date     Endometriosis, site unspecified      Female infertility of unspecified origin        Past Surgical History:   Procedure Laterality Date     EYE SURGERY  2010    [facility]  DELIVERY ONLY  2003    , Low Cervical       Family History   Problem Relation Age of Onset     Lipids Mother         high cholesterol     Hyperlipidemia Mother      Hypertension Father      Heart Disease Father         CAD     Hyperlipidemia Father      Prostate Cancer Father      Other Cancer Father         Pancreatic       Social History     Socioeconomic History     Marital status:      Spouse name: Not on file     Number of children: Not on file     Years of education: Not on file     Highest education level: Not on file   Occupational History     Not on file   Tobacco Use     Smoking status: Never Smoker     Smokeless tobacco: Never Used   Substance and Sexual Activity     Alcohol use: Yes     Comment: 2 per week  "    Drug use: No     Sexual activity: Yes     Partners: Male   Other Topics Concern     Parent/sibling w/ CABG, MI or angioplasty before 65F 55M? No   Social History Narrative     Not on file     Social Determinants of Health     Financial Resource Strain: Not on file   Food Insecurity: Not on file   Transportation Needs: Not on file   Physical Activity: Not on file   Stress: Not on file   Social Connections: Not on file   Intimate Partner Violence: Not on file   Housing Stability: Not on file       Current Outpatient Medications   Medication Sig Dispense Refill     Multiple Vitamins-Minerals (MULTIVITAL PO)          Medications and history reviewed    Physical exam:  Vitals: /68   Temp 99.1  F (37.3  C) (Temporal)   Ht 1.626 m (5' 4\")   Wt 74.8 kg (165 lb)   BMI 28.32 kg/m    BMI= Body mass index is 28.32 kg/m .    Constitutional: Healthy, alert, non-distressed   Head: Normo-cephalic, atraumatic, no lesions, masses or tenderness   Cardiovascular: RRR, no new murmurs, +S1, +S2 heart sounds, no clicks, rubs or gallops   Respiratory: CTAB, no rales, rhonchi or wheezing, equal chest rise, good respiratory effort   Gastrointestinal: Soft, non-tender, non distended, no rebound rigidity or guarding, no masses or hernias palpated   : Deferred  Musculoskeletal: Moves all extremities, normal  strength, no deformities noted   Skin: No suspicious lesions or rashes   Psychiatric: Mentation appears normal, affect appropriate   Hematologic/Lymphatic/Immunologic: Normal cervical and supraclavicular lymph nodes   Patient able to get up on table without difficulty.    Labs show:  No results found for this or any previous visit (from the past 24 hour(s)).    Imaging shows:  Recent Results (from the past 744 hour(s))   US Abdomen Complete    Narrative    US ABDOMEN COMPLETE 5/16/2022 8:43 AM    CLINICAL HISTORY: RUQ abdominal pain    TECHNIQUE: Complete abdominal ultrasound.    COMPARISON: " None.    FINDINGS:    GALLBLADDER: Gallstones in an otherwise normal gallbladder. No wall  thickening, or pericholecystic fluid. Negative sonographic Alonso's  sign.    BILE DUCTS: No biliary dilatation. The common duct measures 4 mm.    LIVER: Normal parenchyma with smooth contour. No focal mass.    RIGHT KIDNEY: Normal size. Normal echogenicity with no hydronephrosis  or mass.     LEFT KIDNEY: Normal size. Normal echogenicity with no hydronephrosis  or mass.     SPLEEN: Normal.    PANCREAS: The visualized portions are normal.    AORTA: Normal in caliber.     IVC: Normal where visualized.    No ascites.      Impression    IMPRESSION:  1.  Cholelithiasis. No bile duct dilatation or signs of cholecystitis.    CAITLIN CHAUDHRY MD         SYSTEM ID:  [id number]        Assessment:     ICD-10-CM    1. Symptomatic cholelithiasis  K80.20    2. RUQ abdominal pain  R10.11 Adult General Surg Referral     Plan: I recommend laparoscopic cholecystectomy, possible open for her symptomatic cholelithiasis and large gallstone. Discussed imaging findings and what to expect with surgery. Risks of bleeding, infection, anesthesia complications, conversion to open, injury to nearby structures and bile duct injury all discussed.   We went over my discharge instructions and post-op restrictions.  Patient questions answered and we will schedule the procedure.    45 minutes spent on the date of the encounter doing chart review, history and exam, documentation and further activities per the note    Umesh Wen DO        Again, thank you for allowing me to participate in the care of your patient.        Sincerely,        Umesh Wen DO

## 2022-05-25 NOTE — TELEPHONE ENCOUNTER
Type of surgery: CHOLECYSTECTOMY, LAPAROSCOPIC, possible open    Location of surgery: [facility]  Date and time of surgery: 6/22  Surgeon: Bettye  Pre-Op Appt Date: 6/2  Post-Op Appt Date: 6/30   Packet sent out: Yes  Pre-cert/Authorization completed:  Not Applicable  Date: na

## 2022-05-25 NOTE — PROGRESS NOTES
Patient seen in consultation for symptomatic cholelithiasis by Everett Marquez    HPI:  Patient is a 51 year old female with history of recurring post-prandial RUQ abdominal pain with nausea and vomiting. She also endorses loose stools. She had US which showed cholelithiasis and one large almost 3cm gallstone. She's had laparoscopy in the past and .     Review Of Systems    Skin: negative  Ears/Nose/Throat: negative  Respiratory: No shortness of breath, dyspnea on exertion, cough, or hemoptysis  Cardiovascular: negative  Gastrointestinal: as above  Genitourinary: negative  Musculoskeletal: negative  Neurologic: negative  Hematologic/Lymphatic/Immunologic: negative  Endocrine: negative      Past Medical History:   Diagnosis Date     Endometriosis, site unspecified      Female infertility of unspecified origin        Past Surgical History:   Procedure Laterality Date     EYE SURGERY  2010    Lasik     ZZC  DELIVERY ONLY  2003    , Low Cervical       Family History   Problem Relation Age of Onset     Lipids Mother         high cholesterol     Hyperlipidemia Mother      Hypertension Father      Heart Disease Father         CAD     Hyperlipidemia Father      Prostate Cancer Father      Other Cancer Father         Pancreatic       Social History     Socioeconomic History     Marital status:      Spouse name: Not on file     Number of children: Not on file     Years of education: Not on file     Highest education level: Not on file   Occupational History     Not on file   Tobacco Use     Smoking status: Never Smoker     Smokeless tobacco: Never Used   Substance and Sexual Activity     Alcohol use: Yes     Comment: 2 per week     Drug use: No     Sexual activity: Yes     Partners: Male   Other Topics Concern     Parent/sibling w/ CABG, MI or angioplasty before 65F 55M? No   Social History Narrative     Not on file     Social Determinants of Health     Financial Resource  "Strain: Not on file   Food Insecurity: Not on file   Transportation Needs: Not on file   Physical Activity: Not on file   Stress: Not on file   Social Connections: Not on file   Intimate Partner Violence: Not on file   Housing Stability: Not on file       Current Outpatient Medications   Medication Sig Dispense Refill     Multiple Vitamins-Minerals (MULTIVITAL PO)          Medications and history reviewed    Physical exam:  Vitals: /68   Temp 99.1  F (37.3  C) (Temporal)   Ht 1.626 m (5' 4\")   Wt 74.8 kg (165 lb)   BMI 28.32 kg/m    BMI= Body mass index is 28.32 kg/m .    Constitutional: Healthy, alert, non-distressed   Head: Normo-cephalic, atraumatic, no lesions, masses or tenderness   Cardiovascular: RRR, no new murmurs, +S1, +S2 heart sounds, no clicks, rubs or gallops   Respiratory: CTAB, no rales, rhonchi or wheezing, equal chest rise, good respiratory effort   Gastrointestinal: Soft, non-tender, non distended, no rebound rigidity or guarding, no masses or hernias palpated   : Deferred  Musculoskeletal: Moves all extremities, normal  strength, no deformities noted   Skin: No suspicious lesions or rashes   Psychiatric: Mentation appears normal, affect appropriate   Hematologic/Lymphatic/Immunologic: Normal cervical and supraclavicular lymph nodes   Patient able to get up on table without difficulty.    Labs show:  No results found for this or any previous visit (from the past 24 hour(s)).    Imaging shows:  Recent Results (from the past 744 hour(s))   US Abdomen Complete    Narrative    US ABDOMEN COMPLETE 5/16/2022 8:43 AM    CLINICAL HISTORY: RUQ abdominal pain    TECHNIQUE: Complete abdominal ultrasound.    COMPARISON: None.    FINDINGS:    GALLBLADDER: Gallstones in an otherwise normal gallbladder. No wall  thickening, or pericholecystic fluid. Negative sonographic Alonso's  sign.    BILE DUCTS: No biliary dilatation. The common duct measures 4 mm.    LIVER: Normal parenchyma with smooth " contour. No focal mass.    RIGHT KIDNEY: Normal size. Normal echogenicity with no hydronephrosis  or mass.     LEFT KIDNEY: Normal size. Normal echogenicity with no hydronephrosis  or mass.     SPLEEN: Normal.    PANCREAS: The visualized portions are normal.    AORTA: Normal in caliber.     IVC: Normal where visualized.    No ascites.      Impression    IMPRESSION:  1.  Cholelithiasis. No bile duct dilatation or signs of cholecystitis.    CAITLIN CHAUDHRY MD         SYSTEM ID:  [id number]        Assessment:     ICD-10-CM    1. Symptomatic cholelithiasis  K80.20    2. RUQ abdominal pain  R10.11 Adult General Surg Referral     Plan: I recommend laparoscopic cholecystectomy, possible open for her symptomatic cholelithiasis and large gallstone. Discussed imaging findings and what to expect with surgery. Risks of bleeding, infection, anesthesia complications, conversion to open, injury to nearby structures and bile duct injury all discussed.   We went over my discharge instructions and post-op restrictions.  Patient questions answered and we will schedule the procedure.    45 minutes spent on the date of the encounter doing chart review, history and exam, documentation and further activities per the note    Umesh Wen, DO

## 2022-06-02 ENCOUNTER — OFFICE VISIT (OUTPATIENT)
Dept: FAMILY MEDICINE | Facility: CLINIC | Age: 52
End: 2022-06-02
Payer: COMMERCIAL

## 2022-06-02 VITALS
RESPIRATION RATE: 16 BRPM | BODY MASS INDEX: 28.32 KG/M2 | WEIGHT: 165 LBS | DIASTOLIC BLOOD PRESSURE: 74 MMHG | SYSTOLIC BLOOD PRESSURE: 114 MMHG | HEART RATE: 79 BPM | OXYGEN SATURATION: 100 % | TEMPERATURE: 98.1 F

## 2022-06-02 DIAGNOSIS — Z01.818 PREOP GENERAL PHYSICAL EXAM: Primary | ICD-10-CM

## 2022-06-02 DIAGNOSIS — K80.20 CALCULUS OF GALLBLADDER WITHOUT CHOLECYSTITIS WITHOUT OBSTRUCTION: ICD-10-CM

## 2022-06-02 PROCEDURE — 99214 OFFICE O/P EST MOD 30 MIN: CPT | Performed by: OBSTETRICS & GYNECOLOGY

## 2022-06-02 ASSESSMENT — PAIN SCALES - GENERAL: PAINLEVEL: NO PAIN (0)

## 2022-06-02 NOTE — PATIENT INSTRUCTIONS
Preparing for Your Surgery  Getting started  A nurse will call you to review your health history and instructions. They will give you an arrival time based on your scheduled surgery time. Please be ready to share:    Your doctor's clinic name and phone number    Your medical, surgical and anesthesia history    A list of allergies and sensitivities    A list of medicines, including herbal treatments and over-the-counter drugs    Whether the patient has a legal guardian (ask how to send us the papers in advance)  Please tell us if you're pregnant--or if there's any chance you might be pregnant. Some surgeries may injure a fetus (unborn baby), so they require a pregnancy test. Surgeries that are safe for a fetus don't always need a test, and you can choose whether to have one.   If you have a child who's having surgery, please ask for a copy of Preparing for Your Child's Surgery.    Preparing for surgery    Within 30 days of surgery: Have a pre-op exam (sometimes called an H&P, or History and Physical). This can be done at a clinic or pre-operative center.  ? If you're having a , you may not need this exam. Talk to your care team.    At your pre-op exam, talk to your care team about all medicines you take. If you need to stop any medicines before surgery, ask when to start taking them again.  ? We do this for your safety. Many medicines can make you bleed too much during surgery. Some change how well surgery (anesthesia) drugs work.    Call your insurance company to let them know you're having surgery. (If you don't have insurance, call [phone].)    Call your clinic if there's any change in your health. This includes signs of a cold or flu (sore throat, runny nose, cough, rash, fever). It also includes a scrape or scratch near the surgery site.    If you have questions on the day of surgery, call your hospital or surgery center.  COVID testing  You may need to be tested for COVID-19 before having  surgery. If so, we will give you instructions.  Eating and drinking guidelines  For your safety: Unless your surgeon tells you otherwise, follow the guidelines below.    Eat and drink as usual until 8 hours before surgery. After that, no food or milk.    Drink clear liquids until 2 hours before surgery. These are liquids you can see through, like water, Gatorade and Propel Water. You may also have black coffee and tea (no cream or milk).    Nothing by mouth within 2 hours of surgery. This includes gum, candy and breath mints.    If you drink alcohol: Stop drinking it the night before surgery.    If your care team tells you to take medicine on the morning of surgery, it's okay to take it with a sip of water.  Preventing infection    Shower or bathe the night before and morning of your surgery. Follow the instructions your clinic gave you. (If no instructions, use regular soap.)    Don't shave or clip hair near your surgery site. We'll remove the hair if needed.    Don't smoke or vape the morning of surgery. You may chew nicotine gum up to 2 hours before surgery. A nicotine patch is okay.  ? Note: Some surgeries require you to completely quit smoking and nicotine. Check with your surgeon.    Your care team will make every effort to keep you safe from infection. We will:  ? Clean our hands often with soap and water (or an alcohol-based hand rub).  ? Clean the skin at your surgery site with a special soap that kills germs.  ? Give you a special gown to keep you warm. (Cold raises the risk of infection.)  ? Wear special hair covers, masks, gowns and gloves during surgery.  ? Give antibiotic medicine, if prescribed. Not all surgeries need antibiotics.  What to bring on the day of surgery    Photo ID and insurance card    Copy of your health care directive, if you have one    Glasses and hearing aides (bring cases)  ? You can't wear contacts during surgery    Inhaler and eye drops, if you use them (tell us about these when  you arrive)    CPAP machine or breathing device, if you use them    A few personal items, if spending the night    If you have . . .  ? A pacemaker, ICD (cardiac defibrillator) or other implant: Bring the ID card.  ? An implanted stimulator: Bring the remote control.  ? A legal guardian: Bring a copy of the certified (court-stamped) guardianship papers.  Please remove any jewelry, including body piercings. Leave jewelry and other valuables at home.  If you're going home the day of surgery    You must have a responsible adult drive you home. They should stay with you overnight as well.    If you don't have someone to stay with you, and you aren't safe to go home alone, we may keep you overnight. Insurance often won't pay for this.  After surgery  If it's hard to control your pain or you need more pain medicine, please call your surgeon's office.  Questions?   If you have any questions for your care team, list them here: _________________________________________________________________________________________________________________________________________________________________________ ____________________________________ ____________________________________ ____________________________________  For informational purposes only. Not to replace the advice of your health care provider. Copyright   2003, 2019 [facility]. All rights reserved. Clinically reviewed by Elizabeth Garcia MD. Page Mage [id number] - REV 07/21.

## 2022-06-02 NOTE — PROGRESS NOTES
Preop Questions 6/1/2022   1. Have you ever had a heart attack or stroke? No   2. Have you ever had surgery on your heart or blood vessels, such as a stent placement, a coronary artery bypass, or surgery on an artery in your head, neck, heart, or legs? No   3. Do you have chest pain with activity? No   4. Do you have a history of  heart failure? No   5. Do you currently have a cold, bronchitis or symptoms of other infection? No   6. Do you have a cough, shortness of breath, or wheezing? No   7. Do you or anyone in your family have previous history of blood clots? No   8. Do you or does anyone in your family have a serious bleeding problem such as prolonged bleeding following surgeries or cuts? No   9. Have you ever had problems with anemia or been told to take iron pills? No   10. Have you had any abnormal blood loss such as black, tarry or bloody stools, or abnormal vaginal bleeding? No   11. Have you ever had a blood transfusion? No   12. Are you willing to have a blood transfusion if it is medically needed before, during, or after your surgery? Yes   13. Have you or any of your relatives ever had problems with anesthesia? YES -  She has had laparoscopy and felt sick to stomach afterwards   14. Do you have sleep apnea, excessive snoring or daytime drowsiness? No   15. Do you have any artifical heart valves or other implanted medical devices like a pacemaker, defibrillator, or continuous glucose monitor? No   16. Do you have artificial joints? No   17. Are you allergic to latex? No   18. Is there any chance that you may be pregnant? No     Preoperative History and Physical    Chief complaint/indicated for surgery/planned surgery:    Gisel is planning to undergo laparoscopic cholecystectomy by Dr Wen.    Past Medical History:   Diagnosis Date     Endometriosis, site unspecified      Female infertility of unspecified origin      Current Outpatient Medications   Medication Sig Dispense Refill     Multiple  Vitamins-Minerals (MULTIVITAL PO)          There has been no recent use of OTC or herbal medications.   The patient denies any recent ASA or NSAID use.   The patient denies any recent steroid use within the last 6 months.   The patient denies any alcohol or drug use.     Allergies   Allergen Reactions     Benadryl [Diphenhydramine] Hives     Zithromax [Azithromycin Dihydrate]      Hives, unknown if from Zithromax.  Could have been new face cream or new type of popcorn she had that day      History   Smoking Status     Never Smoker   Smokeless Tobacco     Never Used     Past Surgical History:   Procedure Laterality Date     EYE SURGERY  2010    Lasik     ZZC  DELIVERY ONLY  2003    , Low Cervical     Social History     Socioeconomic History     Marital status:      Spouse name: Not on file     Number of children: Not on file     Years of education: Not on file     Highest education level: Not on file   Occupational History     Not on file   Tobacco Use     Smoking status: Never Smoker     Smokeless tobacco: Never Used   Substance and Sexual Activity     Alcohol use: Yes     Comment: 2 per week     Drug use: No     Sexual activity: Yes     Partners: Male   Other Topics Concern     Parent/sibling w/ CABG, MI or angioplasty before 65F 55M? No   Social History Narrative     Not on file     Social Determinants of Health     Financial Resource Strain: Not on file   Food Insecurity: Not on file   Transportation Needs: Not on file   Physical Activity: Not on file   Stress: Not on file   Social Connections: Not on file   Intimate Partner Violence: Not on file   Housing Stability: Not on file     Family History   Problem Relation Age of Onset     Lipids Mother         high cholesterol     Hyperlipidemia Mother      Hypertension Father      Heart Disease Father         CAD     Hyperlipidemia Father      Prostate Cancer Father      Other Cancer Father         Pancreatic       There is no family  history of anesthesia reactions or malignant hyperthermia or psevdocholinestergse problem or porphyria.    Review Of Systems  Skin: negative  Eyes: negative  Ears/Nose/Throat: negative  Respiratory: No shortness of breath, dyspnea on exertion, cough, or hemoptysis  Cardiovascular: negative  Gastrointestinal: negative  Genitourinary: negative  Musculoskeletal: negative  Neurologic: negative  Psychiatric: negative  Hematologic/Lymphatic/Immunologic: negative  Endocrine: negative    Physical Exam:/74 (Cuff Size: Adult Regular)   Pulse 79   Temp 98.1  F (36.7  C) (Temporal)   Resp 16   Wt 74.8 kg (165 lb)   LMP 07/18/2019   SpO2 100%   BMI 28.32 kg/m      HEENT:    Sclerae and conjunctiva are normal.   Ear canals and TMs look normal.  Nasal mucosa is pink  - no polyps or masses seen.  Throat is unremarkable . Mucous membranes are moist.     Neck is supple, mobile, no adenopathy or masses palpable. The thyroid feels normal.   Normal range of motion noted.    Chest is clear to auscultation.  No wheezes, rales or rhonchi heard.  Cardiac exam is normal with s1, s2, no murmurs or adventitious sounds.Normal rate and rhythm is heard.     Abdomen is soft,  nondistended, No masses felt.No HSM. No guarding or rigidity or rebound   noted. Palpation reveals  no    tenderness   Normal bowel sounds heard.     Extremities are pink and there is no cyanosis and there is no edema. Pulses are physiologic.    The neurologic exam is grossly intact.Motor and sensory exams are grossly normal. Cranial nerves 2-12 are intact. Cerebellar exam is normal.         Other:  Labs: cbc was normal 5/12/22    Assessment/Impression:  1.  Symptomatic cholelithiasis-to have laparoscopic cholecystectomy by Dr. Wen    2.Preoperative  Examination: The patient is at LOW   risk for general anesthesia for the proposed surgery.          Recommendations:  Approval given for general anesthesia.    Medications are to be stopped before  surgery.  Discontinue ASA and NSAIDS 5 days prior to surgery to reduce bleeding risk.      A total of 35 minutes were spent in today's visit with the patient in addition to the time spent charting on this patient today.      JANNET Marquez MD

## 2022-06-16 ENCOUNTER — LAB (OUTPATIENT)
Dept: LAB | Facility: CLINIC | Age: 52
End: 2022-06-16
Payer: COMMERCIAL

## 2022-06-16 DIAGNOSIS — Z11.52 ENCOUNTER FOR SCREENING FOR COVID-19: Primary | ICD-10-CM

## 2022-06-16 LAB — SARS-COV-2 RNA RESP QL NAA+PROBE: NEGATIVE

## 2022-06-16 PROCEDURE — U0003 INFECTIOUS AGENT DETECTION BY NUCLEIC ACID (DNA OR RNA); SEVERE ACUTE RESPIRATORY SYNDROME CORONAVIRUS 2 (SARS-COV-2) (CORONAVIRUS DISEASE [COVID-19]), AMPLIFIED PROBE TECHNIQUE, MAKING USE OF HIGH THROUGHPUT TECHNOLOGIES AS DESCRIBED BY CMS-2020-01-R: HCPCS

## 2022-06-16 PROCEDURE — U0005 INFEC AGEN DETEC AMPLI PROBE: HCPCS

## 2022-06-20 ENCOUNTER — ANESTHESIA (OUTPATIENT)
Dept: SURGERY | Facility: CLINIC | Age: 52
End: 2022-06-20
Payer: COMMERCIAL

## 2022-06-20 ENCOUNTER — HOSPITAL ENCOUNTER (OUTPATIENT)
Facility: CLINIC | Age: 52
Discharge: HOME | End: 2022-06-20
Attending: SURGERY | Admitting: SURGERY
Payer: COMMERCIAL

## 2022-06-20 ENCOUNTER — ANESTHESIA EVENT (OUTPATIENT)
Dept: SURGERY | Facility: CLINIC | Age: 52
End: 2022-06-20
Payer: COMMERCIAL

## 2022-06-20 VITALS
SYSTOLIC BLOOD PRESSURE: 117 MMHG | HEART RATE: 64 BPM | OXYGEN SATURATION: 98 % | RESPIRATION RATE: 16 BRPM | DIASTOLIC BLOOD PRESSURE: 66 MMHG | TEMPERATURE: 97.16 F

## 2022-06-20 DIAGNOSIS — Z90.49 S/P LAPAROSCOPIC CHOLECYSTECTOMY: Primary | ICD-10-CM

## 2022-06-20 PROCEDURE — 272N000001 HC OR GENERAL SUPPLY STERILE: Performed by: SURGERY

## 2022-06-20 PROCEDURE — 999N000141 HC STATISTIC PRE-PROCEDURE NURSING ASSESSMENT: Performed by: SURGERY

## 2022-06-20 PROCEDURE — 258N000003 HC RX IP 258 OP 636: Performed by: NURSE ANESTHETIST, CERTIFIED REGISTERED

## 2022-06-20 PROCEDURE — 250N000013 HC RX MED GY IP 250 OP 250 PS 637: Performed by: NURSE ANESTHETIST, CERTIFIED REGISTERED

## 2022-06-20 PROCEDURE — 250N000009 HC RX 250: Performed by: SURGERY

## 2022-06-20 PROCEDURE — 370N000017 HC ANESTHESIA TECHNICAL FEE, PER MIN: Performed by: SURGERY

## 2022-06-20 PROCEDURE — 710N000010 HC RECOVERY PHASE 1, LEVEL 2, PER MIN: Performed by: SURGERY

## 2022-06-20 PROCEDURE — 88304 TISSUE EXAM BY PATHOLOGIST: CPT | Mod: 26 | Performed by: PATHOLOGY

## 2022-06-20 PROCEDURE — 250N000009 HC RX 250: Performed by: NURSE ANESTHETIST, CERTIFIED REGISTERED

## 2022-06-20 PROCEDURE — 360N000076 HC SURGERY LEVEL 3, PER MIN: Performed by: SURGERY

## 2022-06-20 PROCEDURE — 250N000011 HC RX IP 250 OP 636: Performed by: NURSE ANESTHETIST, CERTIFIED REGISTERED

## 2022-06-20 PROCEDURE — 710N000012 HC RECOVERY PHASE 2, PER MINUTE: Performed by: SURGERY

## 2022-06-20 PROCEDURE — 250N000011 HC RX IP 250 OP 636: Performed by: SURGERY

## 2022-06-20 PROCEDURE — 88304 TISSUE EXAM BY PATHOLOGIST: CPT | Mod: TC | Performed by: SURGERY

## 2022-06-20 PROCEDURE — 47562 LAPAROSCOPIC CHOLECYSTECTOMY: CPT | Performed by: SURGERY

## 2022-06-20 RX ORDER — SODIUM CHLORIDE, SODIUM LACTATE, POTASSIUM CHLORIDE, CALCIUM CHLORIDE 600; 310; 30; 20 MG/100ML; MG/100ML; MG/100ML; MG/100ML
INJECTION, SOLUTION INTRAVENOUS CONTINUOUS
Status: DISCONTINUED | OUTPATIENT
Start: 2022-06-20 | End: 2022-06-20 | Stop reason: HOSPADM

## 2022-06-20 RX ORDER — CEFAZOLIN SODIUM/WATER 2 G/20 ML
2 SYRINGE (ML) INTRAVENOUS
Status: COMPLETED | OUTPATIENT
Start: 2022-06-20 | End: 2022-06-20

## 2022-06-20 RX ORDER — OXYCODONE HYDROCHLORIDE 5 MG/1
5 TABLET ORAL EVERY 4 HOURS PRN
Status: DISCONTINUED | OUTPATIENT
Start: 2022-06-20 | End: 2022-06-20 | Stop reason: HOSPADM

## 2022-06-20 RX ORDER — DEXAMETHASONE SODIUM PHOSPHATE 10 MG/ML
INJECTION, SOLUTION INTRAMUSCULAR; INTRAVENOUS PRN
Status: DISCONTINUED | OUTPATIENT
Start: 2022-06-20 | End: 2022-06-20

## 2022-06-20 RX ORDER — FENTANYL CITRATE 50 UG/ML
25 INJECTION, SOLUTION INTRAMUSCULAR; INTRAVENOUS EVERY 5 MIN PRN
Status: DISCONTINUED | OUTPATIENT
Start: 2022-06-20 | End: 2022-06-20 | Stop reason: HOSPADM

## 2022-06-20 RX ORDER — ACETAMINOPHEN 500 MG
500-1000 TABLET ORAL EVERY 6 HOURS PRN
COMMUNITY
End: 2023-09-22

## 2022-06-20 RX ORDER — FENTANYL CITRATE 50 UG/ML
INJECTION, SOLUTION INTRAMUSCULAR; INTRAVENOUS PRN
Status: DISCONTINUED | OUTPATIENT
Start: 2022-06-20 | End: 2022-06-20

## 2022-06-20 RX ORDER — PROPOFOL 10 MG/ML
INJECTION, EMULSION INTRAVENOUS PRN
Status: DISCONTINUED | OUTPATIENT
Start: 2022-06-20 | End: 2022-06-20

## 2022-06-20 RX ORDER — LIDOCAINE HYDROCHLORIDE 20 MG/ML
INJECTION, SOLUTION INFILTRATION; PERINEURAL PRN
Status: DISCONTINUED | OUTPATIENT
Start: 2022-06-20 | End: 2022-06-20

## 2022-06-20 RX ORDER — ONDANSETRON 2 MG/ML
4 INJECTION INTRAMUSCULAR; INTRAVENOUS EVERY 30 MIN PRN
Status: DISCONTINUED | OUTPATIENT
Start: 2022-06-20 | End: 2022-06-20 | Stop reason: HOSPADM

## 2022-06-20 RX ORDER — ONDANSETRON 2 MG/ML
INJECTION INTRAMUSCULAR; INTRAVENOUS PRN
Status: DISCONTINUED | OUTPATIENT
Start: 2022-06-20 | End: 2022-06-20

## 2022-06-20 RX ORDER — OXYCODONE AND ACETAMINOPHEN 5; 325 MG/1; MG/1
1-2 TABLET ORAL EVERY 4 HOURS PRN
Qty: 12 TABLET | Refills: 0 | Status: SHIPPED | OUTPATIENT
Start: 2022-06-20 | End: 2022-06-30

## 2022-06-20 RX ORDER — LIDOCAINE 40 MG/G
CREAM TOPICAL
Status: DISCONTINUED | OUTPATIENT
Start: 2022-06-20 | End: 2022-06-20 | Stop reason: HOSPADM

## 2022-06-20 RX ORDER — OXYCODONE AND ACETAMINOPHEN 5; 325 MG/1; MG/1
2 TABLET ORAL
Status: CANCELLED | OUTPATIENT
Start: 2022-06-20

## 2022-06-20 RX ORDER — KETOROLAC TROMETHAMINE 30 MG/ML
INJECTION, SOLUTION INTRAMUSCULAR; INTRAVENOUS PRN
Status: DISCONTINUED | OUTPATIENT
Start: 2022-06-20 | End: 2022-06-20

## 2022-06-20 RX ORDER — METOPROLOL TARTRATE 1 MG/ML
1-2 INJECTION, SOLUTION INTRAVENOUS EVERY 5 MIN PRN
Status: DISCONTINUED | OUTPATIENT
Start: 2022-06-20 | End: 2022-06-20 | Stop reason: HOSPADM

## 2022-06-20 RX ORDER — ONDANSETRON 4 MG/1
4 TABLET, ORALLY DISINTEGRATING ORAL EVERY 30 MIN PRN
Status: DISCONTINUED | OUTPATIENT
Start: 2022-06-20 | End: 2022-06-20 | Stop reason: HOSPADM

## 2022-06-20 RX ORDER — HYDROMORPHONE HYDROCHLORIDE 1 MG/ML
0.2 INJECTION, SOLUTION INTRAMUSCULAR; INTRAVENOUS; SUBCUTANEOUS EVERY 5 MIN PRN
Status: DISCONTINUED | OUTPATIENT
Start: 2022-06-20 | End: 2022-06-20 | Stop reason: HOSPADM

## 2022-06-20 RX ORDER — BUPIVACAINE HYDROCHLORIDE AND EPINEPHRINE 2.5; 5 MG/ML; UG/ML
INJECTION, SOLUTION INFILTRATION; PERINEURAL PRN
Status: DISCONTINUED | OUTPATIENT
Start: 2022-06-20 | End: 2022-06-20 | Stop reason: HOSPADM

## 2022-06-20 RX ORDER — FENTANYL CITRATE 50 UG/ML
25 INJECTION, SOLUTION INTRAMUSCULAR; INTRAVENOUS
Status: DISCONTINUED | OUTPATIENT
Start: 2022-06-20 | End: 2022-06-20 | Stop reason: HOSPADM

## 2022-06-20 RX ORDER — MEPERIDINE HYDROCHLORIDE 25 MG/ML
12.5 INJECTION INTRAMUSCULAR; INTRAVENOUS; SUBCUTANEOUS
Status: DISCONTINUED | OUTPATIENT
Start: 2022-06-20 | End: 2022-06-20 | Stop reason: HOSPADM

## 2022-06-20 RX ORDER — CEFAZOLIN SODIUM/WATER 2 G/20 ML
2 SYRINGE (ML) INTRAVENOUS SEE ADMIN INSTRUCTIONS
Status: DISCONTINUED | OUTPATIENT
Start: 2022-06-20 | End: 2022-06-20 | Stop reason: HOSPADM

## 2022-06-20 RX ORDER — IBUPROFEN 200 MG
600 TABLET ORAL EVERY 4 HOURS PRN
COMMUNITY
End: 2023-09-22

## 2022-06-20 RX ADMIN — LIDOCAINE HYDROCHLORIDE 0.1 ML: 10 INJECTION, SOLUTION EPIDURAL; INFILTRATION; INTRACAUDAL; PERINEURAL at 09:29

## 2022-06-20 RX ADMIN — FENTANYL CITRATE 50 MCG: 50 INJECTION, SOLUTION INTRAMUSCULAR; INTRAVENOUS at 10:59

## 2022-06-20 RX ADMIN — OXYCODONE HYDROCHLORIDE 5 MG: 5 TABLET ORAL at 13:27

## 2022-06-20 RX ADMIN — PROPOFOL 150 MG: 10 INJECTION, EMULSION INTRAVENOUS at 10:36

## 2022-06-20 RX ADMIN — SODIUM CHLORIDE, POTASSIUM CHLORIDE, SODIUM LACTATE AND CALCIUM CHLORIDE: 600; 310; 30; 20 INJECTION, SOLUTION INTRAVENOUS at 11:13

## 2022-06-20 RX ADMIN — ONDANSETRON 4 MG: 2 INJECTION INTRAMUSCULAR; INTRAVENOUS at 11:19

## 2022-06-20 RX ADMIN — Medication 2 G: at 10:38

## 2022-06-20 RX ADMIN — SODIUM CHLORIDE, POTASSIUM CHLORIDE, SODIUM LACTATE AND CALCIUM CHLORIDE: 600; 310; 30; 20 INJECTION, SOLUTION INTRAVENOUS at 09:29

## 2022-06-20 RX ADMIN — FENTANYL CITRATE 25 MCG: 50 INJECTION, SOLUTION INTRAMUSCULAR; INTRAVENOUS at 12:13

## 2022-06-20 RX ADMIN — FENTANYL CITRATE 25 MCG: 50 INJECTION, SOLUTION INTRAMUSCULAR; INTRAVENOUS at 12:05

## 2022-06-20 RX ADMIN — MIDAZOLAM 2 MG: 1 INJECTION INTRAMUSCULAR; INTRAVENOUS at 10:30

## 2022-06-20 RX ADMIN — LIDOCAINE HYDROCHLORIDE 50 MG: 20 INJECTION, SOLUTION INFILTRATION; PERINEURAL at 10:36

## 2022-06-20 RX ADMIN — PROPOFOL 150 MCG/KG/MIN: 10 INJECTION, EMULSION INTRAVENOUS at 10:50

## 2022-06-20 RX ADMIN — SUGAMMADEX 200 MG: 100 INJECTION, SOLUTION INTRAVENOUS at 11:20

## 2022-06-20 RX ADMIN — FENTANYL CITRATE 50 MCG: 50 INJECTION, SOLUTION INTRAMUSCULAR; INTRAVENOUS at 11:16

## 2022-06-20 RX ADMIN — FENTANYL CITRATE 25 MCG: 50 INJECTION, SOLUTION INTRAMUSCULAR; INTRAVENOUS at 12:20

## 2022-06-20 RX ADMIN — KETOROLAC TROMETHAMINE 30 MG: 30 INJECTION, SOLUTION INTRAMUSCULAR at 11:19

## 2022-06-20 RX ADMIN — MEPERIDINE HYDROCHLORIDE 12.5 MG: 25 INJECTION INTRAMUSCULAR; INTRAVENOUS; SUBCUTANEOUS at 12:01

## 2022-06-20 RX ADMIN — FENTANYL CITRATE 50 MCG: 50 INJECTION, SOLUTION INTRAMUSCULAR; INTRAVENOUS at 11:01

## 2022-06-20 RX ADMIN — DEXAMETHASONE SODIUM PHOSPHATE 10 MG: 10 INJECTION, SOLUTION INTRAMUSCULAR; INTRAVENOUS at 10:42

## 2022-06-20 RX ADMIN — ROCURONIUM BROMIDE 50 MG: 50 INJECTION, SOLUTION INTRAVENOUS at 10:36

## 2022-06-20 RX ADMIN — FENTANYL CITRATE 50 MCG: 50 INJECTION, SOLUTION INTRAMUSCULAR; INTRAVENOUS at 10:36

## 2022-06-20 RX ADMIN — FENTANYL CITRATE 25 MCG: 50 INJECTION, SOLUTION INTRAMUSCULAR; INTRAVENOUS at 11:57

## 2022-06-20 NOTE — DISCHARGE INSTRUCTIONS
[facility]    Home Care Following Gallbladder Surgery    Dr. Wen      Care of the Incision:  surgical glue was used on your incision, keep it dry for 24 hours.  Then you may shower but don t submerge under water for at least 2 weeks.  Gently pat your incision dry with a freshly laundered towel.  Do not touch your incision with bare hands or pick at scabs.  Leave your incision open to air.  Cover it only if draining, clothing rubs or irritates it.    Activity:  Gradually increase your activity.  Walk short distances several times each day and increase the distance as your strength allows.  To promote circulation, do not cross your legs while sitting.  Return to work will be determined by the type of work you do and should be discussed with your physician.  Do not drive or operate equipment while taking prescription pain medicines.  You may drive 1 week after surgery if you have stopped taking prescription pain medicines and can react quickly enough to make an emergency stop if necessary.    Diet:  Return to the diet you were on before surgery.  Drink plenty of water.  Avoid foods that cause constipation.    REMEMBER--most prescription pain pills cause constipation.  Walking, extra fluids, and increased fiber (fresh fruits and vegetables, etc.) are natural remedies for constipation.  You can also take mineral oil, 1-2 Tablespoons per day.  If still constipated you may try a stool softener such as Colace or Miralax.    Call Your Physician if You Have:  Redness, increased swelling or cloudy drainage from your incision.  A temperature of more than 101 degrees F.  Worsening pain in your incision not relieved by your prescription pain pills and/or a short rest.  Jaundice (yellowing of skin or eyes)  Abdominal distention (stomach getting very large)  Swelling in your legs  Productive cough  Burning with urination  Any questions or concerns about your recovery, please call Business hours  [phone]    After hours [phone] Nurse Advice Line (24 hours a day)    [facility] Same-Day Surgery   Adult Discharge Orders & Instructions Following Anesthesia    For 24 hours after surgery    Get plenty of rest.  A responsible adult must stay with you for at least 24 hours after you leave the hospital.   Do not drive or use heavy equipment.  If you have weakness or tingling, don't drive or use heavy equipment until this feeling goes away.  Do not drink alcohol.  Avoid strenuous or risky activities.  Ask for help when climbing stairs.   You may feel lightheaded.  If so, sit for a few minutes before standing.  Have someone help you get up.   You may have a slight fever. Call the doctor if your fever is over 100 F (37.7 C) (taken under the tongue) or lasts longer than 24 hours.  You may have a dry mouth, a sore throat, muscle aches or trouble sleeping.  These should go away after 24 hours.  Do not make important or legal decisions.  We don t expect you to have any problems from the surgery or treatment you had today. Just in case, here s what to do if you have pain, upset stomach (nausea), bleeding or infection:  Pain:  Take medicines your doctor has prescribed or over-the-counter medicine they have suggested. Resting and using ice packs can help, too. For surgery on an arm or leg, raise it on a pillow to ease swelling. Call your doctor if these methods don t work.  Copyright [employer], Licensed under [employer]  Upset stomach (nausea):  Take anti-nausea medicine approved by your doctor. Drink clear liquids like apple juice, ginger ale, broth or 7-Up. Be sure to drink enough fluids. Rest can help, too. Move to normal foods when you re ready.   Bleeding:  We do not anticipate that you will have any bleeding from your incision sites, however if you do notice a small trickle of blood apply some pressure and place some gauze or a bandage over the site and observe closely, if it continues  to trickle:  Call your doctor.  Copyright [employer], Licensed under [employer]  Fever/Infection: Please call your doctor if you have any of these signs:  Redness  Swelling  Wound feels warm  Pain gets worse  Bad-smelling fluid leaks from wound  Fever or chills

## 2022-06-20 NOTE — INTERVAL H&P NOTE
"I have reviewed the surgical (or preoperative) H&P that is linked to this encounter, and examined the patient. There are no significant changes    Clinical Conditions Present on Arrival:  Clinically Significant Risk Factors Present on Admission                   # Overweight: Estimated body mass index is 28.32 kg/m  as calculated from the following:    Height as of 5/25/22: 1.626 m (5' 4\").    Weight as of 6/2/22: 74.8 kg (165 lb).       "

## 2022-06-20 NOTE — ANESTHESIA POSTPROCEDURE EVALUATION
Patient: Gisel Gauthier    Procedure: Procedure(s):  CHOLECYSTECTOMY, LAPAROSCOPIC       Anesthesia Type:  General    Note:  Disposition: Outpatient   Postop Pain Control: Uneventful            Sign Out: Well controlled pain   PONV: No   Neuro/Psych: Uneventful            Sign Out: Acceptable/Baseline neuro status   Airway/Respiratory: Uneventful            Sign Out: Acceptable/Baseline resp. status   CV/Hemodynamics: Uneventful            Sign Out: Acceptable CV status   Other NRE: NONE   DID A NON-ROUTINE EVENT OCCUR? No    Event details/Postop Comments:  Pt was happy with anesthesia care.  No complications.  I will follow up with the pt if needed.           Last vitals:  Vitals Value Taken Time   /65 06/20/22 1230   Temp 96.98  F (36.1  C) 06/20/22 1234   Pulse 58 06/20/22 1234   Resp 7 06/20/22 1234   SpO2 98 % 06/20/22 1234   Vitals shown include unvalidated device data.    Electronically Signed By: TESSIE Delcid CRNA  June 20, 2022  6:01 PM

## 2022-06-20 NOTE — ANESTHESIA PREPROCEDURE EVALUATION
Anesthesia Pre-Procedure Evaluation    Patient: Gisel Gauthier   MRN: [id number] : 1970        Procedure : Procedure(s):  CHOLECYSTECTOMY, LAPAROSCOPIC, possible open          Past Medical History:   Diagnosis Date     Endometriosis, site unspecified      Female infertility of unspecified origin       Past Surgical History:   Procedure Laterality Date     EYE SURGERY  2010    Lasik     ZZC  DELIVERY ONLY  2003    , Low Cervical      Allergies   Allergen Reactions     Benadryl [Diphenhydramine] Hives     Zithromax [Azithromycin Dihydrate]      Hives, unknown if from Zithromax.  Could have been new face cream or new type of popcorn she had that day       Social History     Tobacco Use     Smoking status: Never Smoker     Smokeless tobacco: Never Used   Substance Use Topics     Alcohol use: Yes     Comment: 2 per week      Wt Readings from Last 1 Encounters:   22 74.8 kg (165 lb)        Anesthesia Evaluation   Pt has had prior anesthetic. Type: General.    History of anesthetic complications  - PONV.      ROS/MED HX  ENT/Pulmonary:  - neg pulmonary ROS     Neurologic:  - neg neurologic ROS     Cardiovascular:  - neg cardiovascular ROS     METS/Exercise Tolerance:     Hematologic:  - neg hematologic  ROS     Musculoskeletal:  - neg musculoskeletal ROS     GI/Hepatic:  - neg GI/hepatic ROS     Renal/Genitourinary:  - neg Renal ROS     Endo:  - neg endo ROS     Psychiatric/Substance Use:  - neg psychiatric ROS     Infectious Disease:  - neg infectious disease ROS     Malignancy:  - neg malignancy ROS     Other:  - neg other ROS          Physical Exam    Airway  airway exam normal      Mallampati: I   TM distance: > 3 FB   Neck ROM: full   Mouth opening: > 3 cm    Respiratory Devices and Support         Dental  no notable dental history         Cardiovascular   cardiovascular exam normal       Rhythm and rate: regular     Pulmonary   pulmonary exam normal        breath sounds clear  to auscultation           OUTSIDE LABS:  CBC:   Lab Results   Component Value Date    WBC 8.6 05/12/2022    WBC 10.5 10/27/2015    HGB 13.0 05/12/2022    HGB 12.4 10/27/2015    HCT 38.9 05/12/2022    HCT 36.0 10/27/2015     05/12/2022     10/27/2015     BMP:   Lab Results   Component Value Date     05/12/2022    POTASSIUM 3.7 05/12/2022    CHLORIDE 108 05/12/2022    CO2 29 05/12/2022    BUN 16 05/12/2022    CR 0.60 05/12/2022     (H) 05/12/2022    GLC 87 05/14/2008     COAGS: No results found for: PTT, INR, FIBR  POC:   Lab Results   Component Value Date    HCG Negative 08/08/2007     HEPATIC:   Lab Results   Component Value Date    ALBUMIN 4.2 05/12/2022    PROTTOTAL 7.5 05/12/2022    ALT 25 05/12/2022    AST 16 05/12/2022    ALKPHOS 66 05/12/2022    BILITOTAL 0.5 05/12/2022     OTHER:   Lab Results   Component Value Date    PH 8.0 (H) 12/24/2002    JC 8.9 05/12/2022    TSH 2.39 09/04/2019    CRP 1.0 02/15/2017       Anesthesia Plan    ASA Status:  2   NPO Status:  NPO Appropriate    Anesthesia Type: General.     - Airway: ETT   Induction: Intravenous.   Maintenance: TIVA.        Consents    Anesthesia Plan(s) and associated risks, benefits, and realistic alternatives discussed. Questions answered and patient/representative(s) expressed understanding.    - Discussed:     - Discussed with:  Patient      - Extended Intubation/Ventilatory Support Discussed: No.      - Patient is DNR/DNI Status: No    Use of blood products discussed: No .     Postoperative Care    Pain management: IV analgesics.   PONV prophylaxis: Ondansetron (or other 5HT-3), Dexamethasone or Solumedrol     Comments:                TESSIE Cristobal CRNA

## 2022-06-20 NOTE — ANESTHESIA CARE TRANSFER NOTE
Patient: Gisel Gauthier    Procedure: Procedure(s):  CHOLECYSTECTOMY, LAPAROSCOPIC       Diagnosis: Symptomatic cholelithiasis [K80.20]  Diagnosis Additional Information: No value filed.    Anesthesia Type:   General     Note:    Oropharynx: oropharynx clear of all foreign objects and spontaneously breathing  Level of Consciousness: awake  Oxygen Supplementation: face mask  Level of Supplemental Oxygen (L/min / FiO2): 6  Independent Airway: airway patency satisfactory and stable  Dentition: dentition unchanged  Vital Signs Stable: post-procedure vital signs reviewed and stable  Report to RN Given: handoff report given  Patient transferred to: PACU    Handoff Report: Identifed the Patient, Identified the Reponsible Provider, Reviewed the pertinent medical history, Discussed the surgical course, Reviewed Intra-OP anesthesia mangement and issues during anesthesia, Set expectations for post-procedure period and Allowed opportunity for questions and acknowledgement of understanding      Vitals:  Vitals Value Taken Time   BP     Temp     Pulse 89 06/20/22 1142   Resp 12 06/20/22 1142   SpO2 100 % 06/20/22 1142   Vitals shown include unvalidated device data.    Electronically Signed By: TESSIE Cristobal CRNA  June 20, 2022  11:43 AM

## 2022-06-20 NOTE — BRIEF OP NOTE
[facility] Brief Operative Note    Pre-operative diagnosis: Symptomatic cholelithiasis [K80.20]   Post-operative diagnosis symptomatic cholelithiasis     Procedure: Procedure(s):  CHOLECYSTECTOMY, LAPAROSCOPIC   Surgeon(s): Surgeon(s) and Role:     * Umesh Wen,  - Primary   Estimated blood loss: 10ml    Specimens: ID Type Source Tests Collected by Time Destination   1 : Gallbladder and Contents Tissue Gallbladder SURGICAL PATHOLOGY EXAM Umesh Wen DO 6/20/2022 11:02 AM       Findings: Large gallstones, hepatomegaly

## 2022-06-20 NOTE — OP NOTE
Procedure Date: 6/20/2022     PROCEDURE:  Laparoscopic cholecystectomy     PREOPERATIVE DIAGNOSIS:   Symptomatic cholelithiasis     POSTOPERATIVE DIAGNOSIS:   Symptomatic cholelithiasis     SURGEON:  Umesh Wen DO     ASSISTANT:  None.     COMPLICATIONS:  None immediately apparent.     SPECIMENS:  Gallbladder and contents     ESTIMATED BLOOD LOSS:  10 mL     INDICATIONS FOR PROCEDURE:   Gisel is a 51-year-old female who I met in the surgical clinicWith complaints of recurring postprandial right upper quadrant abdominal pain with some nausea and vomiting as well.  She had an ultrasound which did show large almost 3 cm gallstone. I recommended laparoscopic cholecystectomy, possible open, for her symptomatic cholelithiasis We discussed the procedure in detail as well as the risks, benefits, alternatives, postoperative care and restrictions.  After our informed discussion, we agreed to proceed with surgery.     DESCRIPTION OF PROCEDURE:  After the informed consent was obtained, the patient was brought from the preoperative holding area to the operating room and placed in supine position.  Anesthesia was induced.  They were prepped and draped in normal sterile fashion.  Timeout was performed.  After correct patient and correct procedure were verified, we began by making a supraumbilical 5 mm incision.  Veress needle was inserted into the peritoneal cavity and the abdomen was insufflated to 15 mmHg.  A 5 mm trocar was inserted.  Camera was inserted.  General survey of the abdomen revealed no gross abnormalities.  The patient was placed in the head up rotated left position.  Two additional 5 mm trocars and an 11 mm trocar were placed in the right subcostal margin and subxiphoid area respectively.  Gallbladder was retracted and elevated superiorly and laterally.  The peritoneum off of Bolivar's pouch was taken down.  I was able to use a curved [place] dissector to circumferentially dissect around the proximal  portion of the cystic duct.  This dissection was brought posteriorly until I encountered the cystic artery. This was also circumferentially dissected. Posterior to this, the cystic plate of the liver was encountered. Two structures were clearly going into the gallbladder, namely the cystic duct and cystic artery. We had therefore achieved the critical view of safety.  These structures were clipped on the stay side x 2 and the specimen side x 1 and transected with EndoShears.  The gallbladder was then removed from the liver bed using hook electrocautery without issue.  This was placed in an EndoCatch bag and brought through the 11 mm incision.  Survey of the operative field revealed no apparent complications.  No blood or bile staining.  The patient's 11 mm fascial defect was then closed using a PMI closure device and an 0 Vicryl suture in an interrupted fashion.  The patient's abdomen was then desufflated.  All ports were removed under direct visualization.  The skin was instilled with 0.25% Marcaine with epinephrine for local anesthesia.  Skin was closed with inverted interrupted 4-0 Monocryl suture and Exofin dressing was applied.  At the completion of the case, all instruments, needles and sponge counts were accounted for, after a correct count.  The patient was then brought to the recovery room in stable condition.        Umesh Wen DO

## 2022-06-22 LAB
PATH REPORT.COMMENTS IMP SPEC: NORMAL
PATH REPORT.COMMENTS IMP SPEC: NORMAL
PATH REPORT.FINAL DX SPEC: NORMAL
PATH REPORT.GROSS SPEC: NORMAL
PATH REPORT.MICROSCOPIC SPEC OTHER STN: NORMAL
PATH REPORT.RELEVANT HX SPEC: NORMAL
PHOTO IMAGE: NORMAL

## 2022-06-30 ENCOUNTER — OFFICE VISIT (OUTPATIENT)
Dept: SURGERY | Facility: CLINIC | Age: 52
End: 2022-06-30
Payer: COMMERCIAL

## 2022-06-30 VITALS
DIASTOLIC BLOOD PRESSURE: 78 MMHG | WEIGHT: 161 LBS | BODY MASS INDEX: 27.64 KG/M2 | TEMPERATURE: 98.5 F | SYSTOLIC BLOOD PRESSURE: 122 MMHG

## 2022-06-30 DIAGNOSIS — Z90.49 S/P LAPAROSCOPIC CHOLECYSTECTOMY: Primary | ICD-10-CM

## 2022-06-30 PROCEDURE — 99024 POSTOP FOLLOW-UP VISIT: CPT | Performed by: SURGERY

## 2022-06-30 ASSESSMENT — PAIN SCALES - GENERAL: PAINLEVEL: MILD PAIN (2)

## 2022-06-30 NOTE — LETTER
2022         RE: Gisel Gauthier  [address] [facility] [id number]        Dear Colleague,    Thank you for referring your patient, Gisel Gauthier, to the [facility]. Please see a copy of my visit note below.    General Surgery Follow Up    Pt returns for follow up visit s/p rommel delatorree    HPI:  Doing well. Mild ache in the RUQ but no other concerns. Incisions healing well.      Past Medical History:   Diagnosis Date     Endometriosis, site unspecified      Female infertility of unspecified origin        Past Surgical History:   Procedure Laterality Date     EYE SURGERY  2010    Lasik     LAPAROSCOPIC CHOLECYSTECTOMY N/A 2022    Procedure: CHOLECYSTECTOMY, LAPAROSCOPIC;  Surgeon: Umesh Wen DO;  Location:  OR     [facility]  DELIVERY ONLY  2003    , Low Cervical       Social History     Socioeconomic History     Marital status:      Spouse name: Not on file     Number of children: Not on file     Years of education: Not on file     Highest education level: Not on file   Occupational History     Not on file   Tobacco Use     Smoking status: Never Smoker     Smokeless tobacco: Never Used   Substance and Sexual Activity     Alcohol use: Yes     Comment: 2 per week     Drug use: No     Sexual activity: Yes     Partners: Male   Other Topics Concern     Parent/sibling w/ CABG, MI or angioplasty before 65F 55M? No   Social History Narrative     Not on file     Social Determinants of Health     Financial Resource Strain: Not on file   Food Insecurity: Not on file   Transportation Needs: Not on file   Physical Activity: Not on file   Stress: Not on file   Social Connections: Not on file   Intimate Partner Violence: Not on file   Housing Stability: Not on file       Current Outpatient Medications   Medication Sig Dispense Refill     Multiple Vitamins-Minerals (MULTIVITAL PO)        acetaminophen (TYLENOL) 500 MG tablet Take 500-1,000 mg by  mouth every 6 hours as needed for mild pain (Patient not taking: Reported on 6/30/2022)       ibuprofen (ADVIL/MOTRIN) 200 MG tablet Take 600 mg by mouth every 4 hours as needed for mild pain (Patient not taking: Reported on 6/30/2022)         Medications and history reviewed    Physical exam:  Vitals: /78   Temp 98.5  F (36.9  C) (Temporal)   Wt 73 kg (161 lb)   LMP 07/18/2019   BMI 27.64 kg/m    BMI= Body mass index is 27.64 kg/m .    HEART: RRR, no new murmurs  LUNGS: CTAB, equal chest rise, good effort  ABD: soft, non tender, non distended  INCISIONS: c/d/i  EXT: MONTES DE OCA, no deformities    PATHOLOGY:  Cholelithiasis, chronic cholecystitis    Assessment:     ICD-10-CM    1. S/P laparoscopic cholecystectomy  Z90.49      Plan: Doing well. Path reviewed and questions answered. Follow-up prn.    Umesh Wen DO        Again, thank you for allowing me to participate in the care of your patient.        Sincerely,        Umesh Wen DO

## 2022-06-30 NOTE — PROGRESS NOTES
General Surgery Follow Up    Pt returns for follow up visit s/p lap serge    HPI:  Doing well. Mild ache in the RUQ but no other concerns. Incisions healing well.      Past Medical History:   Diagnosis Date     Endometriosis, site unspecified      Female infertility of unspecified origin        Past Surgical History:   Procedure Laterality Date     EYE SURGERY  2010    Lasik     LAPAROSCOPIC CHOLECYSTECTOMY N/A 2022    Procedure: CHOLECYSTECTOMY, LAPAROSCOPIC;  Surgeon: Umesh Wen DO;  Location:  OR     [facility]  DELIVERY ONLY  2003    , Low Cervical       Social History     Socioeconomic History     Marital status:      Spouse name: Not on file     Number of children: Not on file     Years of education: Not on file     Highest education level: Not on file   Occupational History     Not on file   Tobacco Use     Smoking status: Never Smoker     Smokeless tobacco: Never Used   Substance and Sexual Activity     Alcohol use: Yes     Comment: 2 per week     Drug use: No     Sexual activity: Yes     Partners: Male   Other Topics Concern     Parent/sibling w/ CABG, MI or angioplasty before 65F 55M? No   Social History Narrative     Not on file     Social Determinants of Health     Financial Resource Strain: Not on file   Food Insecurity: Not on file   Transportation Needs: Not on file   Physical Activity: Not on file   Stress: Not on file   Social Connections: Not on file   Intimate Partner Violence: Not on file   Housing Stability: Not on file       Current Outpatient Medications   Medication Sig Dispense Refill     Multiple Vitamins-Minerals (MULTIVITAL PO)        acetaminophen (TYLENOL) 500 MG tablet Take 500-1,000 mg by mouth every 6 hours as needed for mild pain (Patient not taking: Reported on 2022)       ibuprofen (ADVIL/MOTRIN) 200 MG tablet Take 600 mg by mouth every 4 hours as needed for mild pain (Patient not taking: Reported on 2022)         Medications  and history reviewed    Physical exam:  Vitals: /78   Temp 98.5  F (36.9  C) (Temporal)   Wt 73 kg (161 lb)   LMP 07/18/2019   BMI 27.64 kg/m    BMI= Body mass index is 27.64 kg/m .    HEART: RRR, no new murmurs  LUNGS: CTAB, equal chest rise, good effort  ABD: soft, non tender, non distended  INCISIONS: c/d/i  EXT: MONTES DE OCA, no deformities    PATHOLOGY:  Cholelithiasis, chronic cholecystitis    Assessment:     ICD-10-CM    1. S/P laparoscopic cholecystectomy  Z90.49      Plan: Doing well. Path reviewed and questions answered. Follow-up prn.    Umesh Wen DO

## 2022-10-09 ENCOUNTER — HEALTH MAINTENANCE LETTER (OUTPATIENT)
Age: 52
End: 2022-10-09

## 2022-12-07 ENCOUNTER — HOSPITAL ENCOUNTER (OUTPATIENT)
Dept: MAMMOGRAPHY | Facility: CLINIC | Age: 52
Discharge: HOME | End: 2022-12-07
Attending: OBSTETRICS & GYNECOLOGY | Admitting: OBSTETRICS & GYNECOLOGY
Payer: COMMERCIAL

## 2022-12-07 DIAGNOSIS — Z12.31 VISIT FOR SCREENING MAMMOGRAM: ICD-10-CM

## 2022-12-07 PROCEDURE — 77067 SCR MAMMO BI INCL CAD: CPT

## 2023-02-18 ENCOUNTER — HEALTH MAINTENANCE LETTER (OUTPATIENT)
Age: 53
End: 2023-02-18

## 2023-04-08 ENCOUNTER — APPOINTMENT (OUTPATIENT)
Dept: ULTRASOUND IMAGING | Facility: CLINIC | Age: 53
End: 2023-04-08
Attending: EMERGENCY MEDICINE
Payer: COMMERCIAL

## 2023-04-08 ENCOUNTER — APPOINTMENT (OUTPATIENT)
Dept: CT IMAGING | Facility: CLINIC | Age: 53
End: 2023-04-08
Attending: EMERGENCY MEDICINE
Payer: COMMERCIAL

## 2023-04-08 ENCOUNTER — HOSPITAL ENCOUNTER (EMERGENCY)
Facility: CLINIC | Age: 53
Discharge: HOME | End: 2023-04-08
Attending: FAMILY MEDICINE | Admitting: FAMILY MEDICINE
Payer: COMMERCIAL

## 2023-04-08 VITALS
TEMPERATURE: 98.4 F | OXYGEN SATURATION: 99 % | WEIGHT: 161 LBS | RESPIRATION RATE: 18 BRPM | HEART RATE: 80 BPM | HEIGHT: 64 IN | SYSTOLIC BLOOD PRESSURE: 134 MMHG | DIASTOLIC BLOOD PRESSURE: 78 MMHG | BODY MASS INDEX: 27.49 KG/M2

## 2023-04-08 DIAGNOSIS — R42 VERTIGO: ICD-10-CM

## 2023-04-08 LAB
ANION GAP SERPL CALCULATED.3IONS-SCNC: 8 MMOL/L (ref 7–15)
BASOPHILS # BLD AUTO: 0.1 10E3/UL (ref 0–0.2)
BASOPHILS NFR BLD AUTO: 1 %
BUN SERPL-MCNC: 15.4 MG/DL (ref 6–20)
CALCIUM SERPL-MCNC: 9.2 MG/DL (ref 8.6–10)
CHLORIDE SERPL-SCNC: 99 MMOL/L (ref 98–107)
CREAT SERPL-MCNC: 0.67 MG/DL (ref 0.51–0.95)
D DIMER PPP FEU-MCNC: 1.93 UG/ML FEU (ref 0–0.5)
DEPRECATED HCO3 PLAS-SCNC: 28 MMOL/L (ref 22–29)
EOSINOPHIL # BLD AUTO: 0.2 10E3/UL (ref 0–0.7)
EOSINOPHIL NFR BLD AUTO: 4 %
ERYTHROCYTE [DISTWIDTH] IN BLOOD BY AUTOMATED COUNT: 12.1 % (ref 10–15)
GFR SERPL CREATININE-BSD FRML MDRD: >90 ML/MIN/1.73M2
GLUCOSE SERPL-MCNC: 100 MG/DL (ref 70–99)
HCT VFR BLD AUTO: 37.9 % (ref 35–47)
HGB BLD-MCNC: 12.8 G/DL (ref 11.7–15.7)
IMM GRANULOCYTES # BLD: 0 10E3/UL
IMM GRANULOCYTES NFR BLD: 0 %
LYMPHOCYTES # BLD AUTO: 1.5 10E3/UL (ref 0.8–5.3)
LYMPHOCYTES NFR BLD AUTO: 26 %
MCH RBC QN AUTO: 31.4 PG (ref 26.5–33)
MCHC RBC AUTO-ENTMCNC: 33.8 G/DL (ref 31.5–36.5)
MCV RBC AUTO: 93 FL (ref 78–100)
MONOCYTES # BLD AUTO: 0.4 10E3/UL (ref 0–1.3)
MONOCYTES NFR BLD AUTO: 7 %
NEUTROPHILS # BLD AUTO: 3.5 10E3/UL (ref 1.6–8.3)
NEUTROPHILS NFR BLD AUTO: 62 %
NRBC # BLD AUTO: 0 10E3/UL
NRBC BLD AUTO-RTO: 0 /100
PLATELET # BLD AUTO: 245 10E3/UL (ref 150–450)
POTASSIUM SERPL-SCNC: 4.1 MMOL/L (ref 3.4–5.3)
RBC # BLD AUTO: 4.08 10E6/UL (ref 3.8–5.2)
SARS-COV-2 RNA RESP QL NAA+PROBE: NEGATIVE
SODIUM SERPL-SCNC: 135 MMOL/L (ref 136–145)
WBC # BLD AUTO: 5.7 10E3/UL (ref 4–11)

## 2023-04-08 PROCEDURE — 70450 CT HEAD/BRAIN W/O DYE: CPT | Mod: XS

## 2023-04-08 PROCEDURE — U0005 INFEC AGEN DETEC AMPLI PROBE: HCPCS | Performed by: EMERGENCY MEDICINE

## 2023-04-08 PROCEDURE — 99285 EMERGENCY DEPT VISIT HI MDM: CPT | Mod: CS,25 | Performed by: FAMILY MEDICINE

## 2023-04-08 PROCEDURE — 250N000011 HC RX IP 250 OP 636: Performed by: EMERGENCY MEDICINE

## 2023-04-08 PROCEDURE — 85025 COMPLETE CBC W/AUTO DIFF WBC: CPT | Performed by: FAMILY MEDICINE

## 2023-04-08 PROCEDURE — 36415 COLL VENOUS BLD VENIPUNCTURE: CPT | Performed by: FAMILY MEDICINE

## 2023-04-08 PROCEDURE — 93971 EXTREMITY STUDY: CPT | Mod: LT

## 2023-04-08 PROCEDURE — 258N000003 HC RX IP 258 OP 636: Performed by: FAMILY MEDICINE

## 2023-04-08 PROCEDURE — 85379 FIBRIN DEGRADATION QUANT: CPT | Performed by: FAMILY MEDICINE

## 2023-04-08 PROCEDURE — 96361 HYDRATE IV INFUSION ADD-ON: CPT | Performed by: FAMILY MEDICINE

## 2023-04-08 PROCEDURE — 80048 BASIC METABOLIC PNL TOTAL CA: CPT | Performed by: FAMILY MEDICINE

## 2023-04-08 PROCEDURE — 96375 TX/PRO/DX INJ NEW DRUG ADDON: CPT | Performed by: FAMILY MEDICINE

## 2023-04-08 PROCEDURE — 70496 CT ANGIOGRAPHY HEAD: CPT

## 2023-04-08 PROCEDURE — 250N000011 HC RX IP 250 OP 636: Performed by: FAMILY MEDICINE

## 2023-04-08 PROCEDURE — 250N000013 HC RX MED GY IP 250 OP 250 PS 637: Performed by: FAMILY MEDICINE

## 2023-04-08 PROCEDURE — 99284 EMERGENCY DEPT VISIT MOD MDM: CPT | Mod: CS | Performed by: FAMILY MEDICINE

## 2023-04-08 PROCEDURE — 250N000009 HC RX 250: Performed by: EMERGENCY MEDICINE

## 2023-04-08 PROCEDURE — 96374 THER/PROPH/DIAG INJ IV PUSH: CPT | Mod: 59 | Performed by: FAMILY MEDICINE

## 2023-04-08 PROCEDURE — 70498 CT ANGIOGRAPHY NECK: CPT

## 2023-04-08 PROCEDURE — C9803 HOPD COVID-19 SPEC COLLECT: HCPCS | Performed by: FAMILY MEDICINE

## 2023-04-08 RX ORDER — IOPAMIDOL 755 MG/ML
200 INJECTION, SOLUTION INTRAVASCULAR ONCE
Status: COMPLETED | OUTPATIENT
Start: 2023-04-08 | End: 2023-04-08

## 2023-04-08 RX ORDER — SODIUM CHLORIDE 9 MG/ML
INJECTION, SOLUTION INTRAVENOUS CONTINUOUS
Status: DISCONTINUED | OUTPATIENT
Start: 2023-04-08 | End: 2023-04-08 | Stop reason: HOSPADM

## 2023-04-08 RX ORDER — ONDANSETRON 2 MG/ML
4 INJECTION INTRAMUSCULAR; INTRAVENOUS EVERY 30 MIN PRN
Status: DISCONTINUED | OUTPATIENT
Start: 2023-04-08 | End: 2023-04-08 | Stop reason: HOSPADM

## 2023-04-08 RX ORDER — LORAZEPAM 2 MG/ML
1 INJECTION INTRAMUSCULAR ONCE
Status: COMPLETED | OUTPATIENT
Start: 2023-04-08 | End: 2023-04-08

## 2023-04-08 RX ORDER — CETIRIZINE HYDROCHLORIDE 10 MG/1
10 TABLET ORAL ONCE
Status: COMPLETED | OUTPATIENT
Start: 2023-04-08 | End: 2023-04-08

## 2023-04-08 RX ADMIN — SODIUM CHLORIDE 1000 ML: 9 INJECTION, SOLUTION INTRAVENOUS at 07:29

## 2023-04-08 RX ADMIN — ONDANSETRON 4 MG: 2 INJECTION INTRAMUSCULAR; INTRAVENOUS at 07:15

## 2023-04-08 RX ADMIN — SODIUM CHLORIDE 100 ML: 9 INJECTION, SOLUTION INTRAVENOUS at 09:54

## 2023-04-08 RX ADMIN — CETIRIZINE HYDROCHLORIDE 10 MG: 10 TABLET, FILM COATED ORAL at 07:29

## 2023-04-08 RX ADMIN — IOPAMIDOL 70 ML: 755 INJECTION, SOLUTION INTRAVENOUS at 09:54

## 2023-04-08 RX ADMIN — LORAZEPAM 1 MG: 2 INJECTION INTRAMUSCULAR; INTRAVENOUS at 08:55

## 2023-04-08 ASSESSMENT — ACTIVITIES OF DAILY LIVING (ADL)
ADLS_ACUITY_SCORE: 35

## 2023-04-08 NOTE — ED TRIAGE NOTES
Pt here with onset of room spinning dizziness at 0400 this AM upon wakening. States went to bed feeling fine. Also, left arm and bilateral leg tingling. Pt appears very anxious with spouse rubbing her shoulders during the triage process. Pt denies any increased stress or anxiety recently. She also denies focal weakness. Does report mild HA.     Triage Assessment       Row Name 04/08/23 0625       Triage Assessment (Adult)    Airway WDL WDL       Respiratory WDL    Respiratory WDL WDL       Skin Circulation/Temperature WDL    Skin Circulation/Temperature WDL WDL       Cardiac WDL    Cardiac WDL WDL       Peripheral/Neurovascular WDL    Peripheral Neurovascular WDL WDL       Cognitive/Neuro/Behavioral WDL    Cognitive/Neuro/Behavioral WDL X  Room spinning dizziness worse with turning of the head

## 2023-04-08 NOTE — ED PROVIDER NOTES
SIGN OUT NOTE    Patient signed out to me at change of shift by Dr. Tripathi.  This is a 52-year-old female who woke this morning with dizziness.  On exam she had some nystagmus to the left.  Modified Epley maneuver attempted but patient had some nausea.  Patient also noted long history of left calf pain but it has increased over the last 2 weeks.  Labs and D-dimer ordered.    Plan for IV fluids, Zofran for nausea, Zyrtec.  I will reevaluate her after she has rested a little while to see if she has any improvement.    Patient had some improvement when I visited with her.  She still has dizziness when looking to the left.  She does have an elevated D-dimer.  Plan to order an ultrasound.  She also has a posterior headache.  Family history of benign ear tumor.  I elected to order a CT/CTA of the head.    Results for orders placed or performed during the hospital encounter of 04/08/23   US Lower Extremity Venous Duplex Left     Status: None    Narrative    EXAM: US LOWER EXTREMITY VENOUS DUPLEX LEFT  LOCATION: [facility]  DATE/TIME: 4/8/2023 10:08 AM    INDICATION: Left leg pain.  COMPARISON: None.  TECHNIQUE: Venous Duplex ultrasound of the left lower extremity with and without compression, augmentation and duplex. Color flow and spectral Doppler with waveform analysis performed.    FINDINGS: Exam includes the common femoral, femoral, popliteal, and contralateral common femoral veins as well as segmentally visualized deep calf veins and greater saphenous vein.     LEFT: No deep vein thrombosis. No superficial thrombophlebitis. No popliteal cyst. No mass or collection identified in the area of pain.      Impression    IMPRESSION:  1.  No deep venous thrombosis in the left lower extremity.   Head CT w/o contrast     Status: None    Narrative    HEAD AND NECK CT ANGIOGRAM WITH IV CONTRAST  4/8/2023 10:20 AM    INDICATION: Headache; Acute HA (< 3 months), no complicating features  TECHNIQUE:  Head and neck CT angiogram with IV contrast. Noncontrast head CT followed by axial helical CT images of the head and neck vessels obtained during the arterial phase of intravenous contrast administration. Axial helical 2D reconstructed images   and multiplanar 3D MIP reconstructed images of the head and neck vessels were performed by the technologist. Dose reduction techniques were used.  CONTRAST: Isovue 370  70ml  COMPARISON: None.     FINDINGS:  NONCONTRAST HEAD CT: No intracranial hemorrhage, extraaxial collection, mass effect or CT evidence of acute infarct.  Normal parenchymal density for age. The ventricles and sulci are normal for age. Osseous structures are intact. The visualized paranasal   sinuses are free of significant disease. The mastoid air cells are free of significant disease. The orbits are unremarkable.    HEAD CTA: The intracranial circulation is patent without evidence for aneurysm, proximal vessel occlusion, high-grade intracranial stenosis or arteriovenous malformation. Patent dural venous sinuses.    NECK CTA: Three vessel arch.  Carotid arteries are patent without atherosclerotic change.  No hemodynamically significant stenosis by NASCET criteria in either carotid system.  Patent vertebral arteries. No dissection. Multiple small thyroid nodules.      Impression    CONCLUSION:  HEAD CT:  1.  No intracranial hemorrhage, mass, or definite CT evidence of recent ischemia.    HEAD CTA:  1.  Negative. No vessel stenosis, occlusion or aneurysm.    NECK CTA:  1.  Negative. No dissection or hemodynamically significant narrowing in the neck by NASCET criteria.   CTA Head Neck with Contrast     Status: None    Narrative    HEAD AND NECK CT ANGIOGRAM WITH IV CONTRAST  4/8/2023 10:20 AM    INDICATION: Headache; Acute HA (< 3 months), no complicating features  TECHNIQUE: Head and neck CT angiogram with IV contrast. Noncontrast head CT followed by axial helical CT images of the head and neck vessels  obtained during the arterial phase of intravenous contrast administration. Axial helical 2D reconstructed images   and multiplanar 3D MIP reconstructed images of the head and neck vessels were performed by the technologist. Dose reduction techniques were used.  CONTRAST: Isovue 370  70ml  COMPARISON: None.     FINDINGS:  NONCONTRAST HEAD CT: No intracranial hemorrhage, extraaxial collection, mass effect or CT evidence of acute infarct.  Normal parenchymal density for age. The ventricles and sulci are normal for age. Osseous structures are intact. The visualized paranasal   sinuses are free of significant disease. The mastoid air cells are free of significant disease. The orbits are unremarkable.    HEAD CTA: The intracranial circulation is patent without evidence for aneurysm, proximal vessel occlusion, high-grade intracranial stenosis or arteriovenous malformation. Patent dural venous sinuses.    NECK CTA: Three vessel arch.  Carotid arteries are patent without atherosclerotic change.  No hemodynamically significant stenosis by NASCET criteria in either carotid system.  Patent vertebral arteries. No dissection. Multiple small thyroid nodules.      Impression    CONCLUSION:  HEAD CT:  1.  No intracranial hemorrhage, mass, or definite CT evidence of recent ischemia.    HEAD CTA:  1.  Negative. No vessel stenosis, occlusion or aneurysm.    NECK CTA:  1.  Negative. No dissection or hemodynamically significant narrowing in the neck by NASCET criteria.   Basic metabolic panel     Status: Abnormal   Result Value Ref Range    Sodium 135 (L) 136 - 145 mmol/L    Potassium 4.1 3.4 - 5.3 mmol/L    Chloride 99 98 - 107 mmol/L    Carbon Dioxide (CO2) 28 22 - 29 mmol/L    Anion Gap 8 7 - 15 mmol/L    Urea Nitrogen 15.4 6.0 - 20.0 mg/dL    Creatinine 0.67 0.51 - 0.95 mg/dL    Calcium 9.2 8.6 - 10.0 mg/dL    Glucose 100 (H) 70 - 99 mg/dL    GFR Estimate >90 >60 mL/min/1.73m2   D dimer quantitative     Status: Abnormal   Result  Value Ref Range    D-Dimer Quantitative 1.93 (H) 0.00 - 0.50 ug/mL FEU    Narrative    This D-dimer assay is intended for use in conjunction with a clinical pretest probability assessment model to exclude pulmonary embolism (PE) and deep venous thrombosis (DVT) in outpatients suspected of PE or DVT. The cut-off value is 0.50 ug/mL FEU.   CBC with platelets and differential     Status: None   Result Value Ref Range    WBC Count 5.7 4.0 - 11.0 10e3/uL    RBC Count 4.08 3.80 - 5.20 10e6/uL    Hemoglobin 12.8 11.7 - 15.7 g/dL    Hematocrit 37.9 35.0 - 47.0 %    MCV 93 78 - 100 fL    MCH 31.4 26.5 - 33.0 pg    MCHC 33.8 31.5 - 36.5 g/dL    RDW 12.1 10.0 - 15.0 %    Platelet Count 245 150 - 450 10e3/uL    % Neutrophils 62 %    % Lymphocytes 26 %    % Monocytes 7 %    % Eosinophils 4 %    % Basophils 1 %    % Immature Granulocytes 0 %    NRBCs per 100 WBC 0 <1 /100    Absolute Neutrophils 3.5 1.6 - 8.3 10e3/uL    Absolute Lymphocytes 1.5 0.8 - 5.3 10e3/uL    Absolute Monocytes 0.4 0.0 - 1.3 10e3/uL    Absolute Eosinophils 0.2 0.0 - 0.7 10e3/uL    Absolute Basophils 0.1 0.0 - 0.2 10e3/uL    Absolute Immature Granulocytes 0.0 <=0.4 10e3/uL    Absolute NRBCs 0.0 10e3/uL   Asymptomatic COVID-19 Virus (Coronavirus) by PCR Nasopharyngeal     Status: Normal    Specimen: Nasopharyngeal; Swab   Result Value Ref Range    SARS CoV2 PCR Negative Negative    Narrative    Testing was performed using the Xpert Xpress SARS-CoV-2 Assay on the Cepheid Gene-Xpert Instrument Systems. Additional information about this Emergency Use Authorization (EUA) assay can be found via the Lab Guide. This test should be ordered for the detection of SARS-CoV-2 in individuals who meet SARS-CoV-2 clinical and/or epidemiological criteria as well as from individuals without symptoms or other reasons to suspect COVID-19. Test performance for asymptomatic patients has only been established in anterior nasal swab specimens. This test is for in vitro diagnostic  use under the FDA EUA for laboratories certified under CLIA to perform high complexity testing. This test has not been FDA cleared or approved. A negative result does not rule out the presence of PCR inhibitors in the specimen or target RNA concentration below the limit of detection for the assay. The possibility of a false negative should be considered if the patient's recent exposure or clinical presentation suggests COVID-19. This test was validated by the [facility] and [facility]. This laboratory is certified under the Clinical Laboratory Improvement Amendments (CLIA) as qualified to perform high complexity laboratory testing.     CBC with platelets differential     Status: None    Narrative    The following orders were created for panel order CBC with platelets differential.  Procedure                               Abnormality         Status                     ---------                               -----------         ------                     CBC with platelets and d...[[id number]]                      Final result                 Please view results for these tests on the individual orders.        CT/CTA are within normal limits.  Lower extremity ultrasound normal.  Checked COVID swab also which was negative.    Results discussed with patient.  She is feeling much improved.  Plan to discharge her to home with recommended follow-up with primary care provider if she continues to have symptoms.  May need referral for physical therapy.  She can look up the Epley maneuver and try it at home if desired.  We talked about using Zyrtec as needed.  Drink plenty of fluids and rest.  Return for worsening, changes or concerns.    Clinical impression:  (R42) Vertigo          Bailey Gonzalez MD  04/09/23 0021

## 2023-04-08 NOTE — DISCHARGE INSTRUCTIONS
"Be sure to stay well-hydrated.    You can take Zyrtec 10 mg up to twice daily if needed for dizziness symptoms.    I recommend looking up \"Epley maneuver\".  You can this or a modified version at home if you have any further dizziness symptoms.    I will send a message to your primary care provider about your ED visit.    If you have any significant worsening, changes or concerns return promptly at any time.    I hope that you continue to improve and have a wonderful Easter!!!  " Patient identifiers verified and correct for Lashay Whitley.   LOC: The patient is awake, alert and aware of environment with an appropriate affect, the patient is oriented x 3 and speaking appropriately.   APPEARANCE: Patient appears uncomfortable but in no acute distress, patient is clean and well groomed.  SKIN: The skin is warm and dry, color consistent with ethnicity, patient has normal skin turgor and moist mucus membranes, skin intact, no breakdown or bruising noted.   MUSCULOSKELETAL: Patient moving all extremities spontaneously, no swelling noted.  RESPIRATORY: Airway is open and patent, respirations are spontaneous, patient has a normal effort and rate, no accessory muscle use noted, Pt has audible wheezes. pt placed on continuous pulse ox with O2 sats noted at 97% on 3 L NC.  CARDIAC: Pt placed on cardiac monitor. Patient has a normal rate and regular rhythm, no edema noted, capillary refill < 3 seconds.   GASTRO: Soft and non tender to palpation, no distention noted, normoactive bowel sounds present in all four quadrants. Pt states bowel movements have been regular.  : Pt denies any pain or frequency with urination.  NEURO: Pt opens eyes spontaneously, behavior appropriate to situation, follows commands, facial expression symmetrical, bilateral hand grasp equal and even, purposeful motor response noted, normal sensation in all extremities when touched with a finger.

## 2023-04-08 NOTE — ED PROVIDER NOTES
History     Chief Complaint   Patient presents with     Dizziness     HPI  Gisel Gauthier is a 52 year old female who presents to the ED this morning with vertigo.  She woke at about 4:00 this morning in the light on the satellite dish was flipping back-and-forth and she felt like she was spinning.  If she sits still, symptoms resolve and then recur if she changes position such as lying on either side.  She has a slight posterior headache at the base of her neck but that is pretty common for her and the typical location for her headaches.  Some tingling in the left arm and both legs and she has had left calf pain for maybe 5 years.   often will massage that.  No swelling.        Allergies:  Allergies   Allergen Reactions     Benadryl [Diphenhydramine] Hives     Zithromax [Azithromycin Dihydrate]      Hives, unknown if from Zithromax.  Could have been new face cream or new type of popcorn she had that day        Problem List:    Patient Active Problem List    Diagnosis Date Noted     Hypercholesterolemia 05/15/2014     Priority: Medium     CARDIOVASCULAR SCREENING; LDL GOAL LESS THAN 160 10/31/2010     Priority: Medium        Past Medical History:    Past Medical History:   Diagnosis Date     Endometriosis, site unspecified      Female infertility of unspecified origin        Past Surgical History:    Past Surgical History:   Procedure Laterality Date     EYE SURGERY  2010    Lasik     LAPAROSCOPIC CHOLECYSTECTOMY N/A 2022    Procedure: CHOLECYSTECTOMY, LAPAROSCOPIC;  Surgeon: Umesh Wen DO;  Location: [facility]  DELIVERY ONLY  2003    , Low Cervical       Family History:    Family History   Problem Relation Age of Onset     Lipids Mother         high cholesterol     Hyperlipidemia Mother      Hypertension Father      Heart Disease Father         CAD     Hyperlipidemia Father      Prostate Cancer Father      Other Cancer Father         Pancreatic       Social  "History:  Marital Status:   [2]  Social History     Tobacco Use     Smoking status: Never     Smokeless tobacco: Never   Substance Use Topics     Alcohol use: Yes     Comment: 2 per week     Drug use: No        Medications:    acetaminophen (TYLENOL) 500 MG tablet  ibuprofen (ADVIL/MOTRIN) 200 MG tablet  Multiple Vitamins-Minerals (MULTIVITAL PO)          Review of Systems   All other systems reviewed and are negative.      Physical Exam   BP: 134/78  Pulse: 80  Temp: 98.4  F (36.9  C)  Resp: 18  Height: 162.6 cm (5' 4\")  Weight: 73 kg (161 lb)  SpO2: 99 %      Physical Exam  Constitutional:       General: She is in acute distress ( mild).      Appearance: Normal appearance.   HENT:      Right Ear: Tympanic membrane normal.      Left Ear: Tympanic membrane normal.      Mouth/Throat:      Mouth: Mucous membranes are moist.      Pharynx: Oropharynx is clear.   Eyes:      Extraocular Movements: Extraocular movements intact.      Left eye: Nystagmus (with left gaze) present.      Pupils: Pupils are equal, round, and reactive to light.   Cardiovascular:      Rate and Rhythm: Normal rate and regular rhythm.   Pulmonary:      Effort: Pulmonary effort is normal.      Breath sounds: Normal breath sounds.   Musculoskeletal:         General: Tenderness (point tenderness, distal left calf) present. No swelling. Normal range of motion.      Right lower leg: No edema.      Left lower leg: No edema.   Skin:     General: Skin is warm and dry.   Neurological:      Mental Status: She is alert and oriented to person, place, and time.      GCS: GCS eye subscore is 4. GCS verbal subscore is 5. GCS motor subscore is 6.      Cranial Nerves: Cranial nerves 2-12 are intact.      Sensory: Sensation is intact.      Motor: Motor function is intact. No weakness.      Coordination: Romberg sign negative. Finger-Nose-Finger Test and Heel to Shin Test normal.   Psychiatric:         Mood and Affect: Mood normal.         ED Course      "            Procedures              Critical Care time:  none               No results found for this or any previous visit (from the past 24 hour(s)).    Medications   0.9% sodium chloride BOLUS (has no administration in time range)     Followed by   sodium chloride 0.9% infusion (has no administration in time range)   ondansetron (ZOFRAN) injection 4 mg (has no administration in time range)       Assessments & Plan (with Medical Decision Making)  52-year-old woke up at about 4:00 this morning with positional vertigo.  She is okay if she sits still but symptoms recur if she tries to lay on her right or left side.  Had some tingling in her legs and left arm but no weakness.  No visual disturbances.  Has a posterior headache but that is very common for her.  This feels identical to previous headaches that she deals with frequently.  She is otherwise in good health.  Takes no regular medications.  She has nystagmus with leftward gaze.  [place] shows some nystagmus on the left.  I did a modified Epley maneuver treating the left side.  She developed significant vertigo in the third position with her head 45 degrees towards the floor to the right.  Then sat her up and the fourth position on the side of the bed and let things settle down.  IV was placed.  Basic labs sent including D-dimer since she has had some calf pain.  He was wondering if she could have a stroke from a clot in her leg.  Its its possible if she had a right to left shunt.  Zofran for nausea.  Zyrtec for nausea and vertigo.  This is acting more like BPPV rather than a cerebellar stroke.  Dr. Gonzalez assumed her care at change of shift.  See her note for the rest of her ED course, lab results, final diagnosis and response to treatment.       I have reviewed the nursing notes.    I have reviewed the findings, diagnosis, plan and need for follow up with the patient.             New Prescriptions    No medications on file         4/8/2023   [facility]  [facility] EMERGENCY DEPT     Issa Crenshaw MD  04/08/23 0797

## 2023-09-20 ENCOUNTER — OFFICE VISIT (OUTPATIENT)
Dept: FAMILY MEDICINE | Facility: CLINIC | Age: 53
End: 2023-09-20
Payer: COMMERCIAL

## 2023-09-20 VITALS
DIASTOLIC BLOOD PRESSURE: 70 MMHG | HEART RATE: 69 BPM | TEMPERATURE: 98 F | BODY MASS INDEX: 27.83 KG/M2 | HEIGHT: 64 IN | SYSTOLIC BLOOD PRESSURE: 128 MMHG | WEIGHT: 163 LBS | OXYGEN SATURATION: 100 % | RESPIRATION RATE: 16 BRPM

## 2023-09-20 DIAGNOSIS — Z12.4 CERVICAL CANCER SCREENING: ICD-10-CM

## 2023-09-20 DIAGNOSIS — Z12.11 SCREEN FOR COLON CANCER: ICD-10-CM

## 2023-09-20 DIAGNOSIS — Z12.83 ENCOUNTER FOR SCREENING FOR MALIGNANT NEOPLASM OF SKIN: ICD-10-CM

## 2023-09-20 DIAGNOSIS — Z00.00 ROUTINE GENERAL MEDICAL EXAMINATION AT A HEALTH CARE FACILITY: Primary | ICD-10-CM

## 2023-09-20 DIAGNOSIS — H81.10 BENIGN PAROXYSMAL POSITIONAL VERTIGO, UNSPECIFIED LATERALITY: ICD-10-CM

## 2023-09-20 DIAGNOSIS — R10.11 RIGHT UPPER QUADRANT PAIN: ICD-10-CM

## 2023-09-20 DIAGNOSIS — E78.00 HYPERCHOLESTEROLEMIA: ICD-10-CM

## 2023-09-20 DIAGNOSIS — Z13.29 SCREENING FOR ENDOCRINE DISORDER: ICD-10-CM

## 2023-09-20 DIAGNOSIS — Z80.0 FAMILY HISTORY OF MALIGNANT NEOPLASM OF PANCREAS: ICD-10-CM

## 2023-09-20 DIAGNOSIS — Z82.49 FAMILY HISTORY OF PREMATURE CORONARY HEART DISEASE: ICD-10-CM

## 2023-09-20 DIAGNOSIS — Z12.31 ENCOUNTER FOR SCREENING MAMMOGRAM FOR BREAST CANCER: ICD-10-CM

## 2023-09-20 DIAGNOSIS — H61.22 IMPACTED CERUMEN OF LEFT EAR: ICD-10-CM

## 2023-09-20 DIAGNOSIS — Z11.59 NEED FOR HEPATITIS C SCREENING TEST: ICD-10-CM

## 2023-09-20 DIAGNOSIS — Z13.220 LIPID SCREENING: ICD-10-CM

## 2023-09-20 DIAGNOSIS — R42 DIZZINESS: ICD-10-CM

## 2023-09-20 DIAGNOSIS — Z13.21 ENCOUNTER FOR VITAMIN DEFICIENCY SCREENING: ICD-10-CM

## 2023-09-20 LAB
ALBUMIN SERPL BCG-MCNC: 4.6 G/DL (ref 3.5–5.2)
ALP SERPL-CCNC: 62 U/L (ref 35–104)
ALT SERPL W P-5'-P-CCNC: 20 U/L (ref 0–50)
ANION GAP SERPL CALCULATED.3IONS-SCNC: 12 MMOL/L (ref 7–15)
AST SERPL W P-5'-P-CCNC: 26 U/L (ref 0–45)
BASOPHILS # BLD AUTO: 0.1 10E3/UL (ref 0–0.2)
BASOPHILS NFR BLD AUTO: 1 %
BILIRUB SERPL-MCNC: 0.5 MG/DL
BUN SERPL-MCNC: 10.5 MG/DL (ref 6–20)
CALCIUM SERPL-MCNC: 9.1 MG/DL (ref 8.6–10)
CHLORIDE SERPL-SCNC: 103 MMOL/L (ref 98–107)
CHOLEST SERPL-MCNC: 323 MG/DL
CREAT SERPL-MCNC: 0.68 MG/DL (ref 0.51–0.95)
DEPRECATED HCO3 PLAS-SCNC: 26 MMOL/L (ref 22–29)
EGFRCR SERPLBLD CKD-EPI 2021: >90 ML/MIN/1.73M2
EOSINOPHIL # BLD AUTO: 0.3 10E3/UL (ref 0–0.7)
EOSINOPHIL NFR BLD AUTO: 5 %
ERYTHROCYTE [DISTWIDTH] IN BLOOD BY AUTOMATED COUNT: 12.2 % (ref 10–15)
FERRITIN SERPL-MCNC: 53 NG/ML (ref 11–328)
GLUCOSE SERPL-MCNC: 86 MG/DL (ref 70–99)
HBA1C MFR BLD: 5.3 % (ref 0–5.6)
HCT VFR BLD AUTO: 41.5 % (ref 35–47)
HDLC SERPL-MCNC: 92 MG/DL
HGB BLD-MCNC: 13.8 G/DL (ref 11.7–15.7)
IMM GRANULOCYTES # BLD: 0 10E3/UL
IMM GRANULOCYTES NFR BLD: 0 %
IRON BINDING CAPACITY (ROCHE): 359 UG/DL (ref 240–430)
IRON SATN MFR SERPL: 33 % (ref 15–46)
IRON SERPL-MCNC: 119 UG/DL (ref 37–145)
LDLC SERPL CALC-MCNC: 219 MG/DL
LIPASE SERPL-CCNC: 36 U/L (ref 13–60)
LYMPHOCYTES # BLD AUTO: 1.8 10E3/UL (ref 0.8–5.3)
LYMPHOCYTES NFR BLD AUTO: 28 %
MCH RBC QN AUTO: 30.6 PG (ref 26.5–33)
MCHC RBC AUTO-ENTMCNC: 33.3 G/DL (ref 31.5–36.5)
MCV RBC AUTO: 92 FL (ref 78–100)
MONOCYTES # BLD AUTO: 0.6 10E3/UL (ref 0–1.3)
MONOCYTES NFR BLD AUTO: 9 %
NEUTROPHILS # BLD AUTO: 3.8 10E3/UL (ref 1.6–8.3)
NEUTROPHILS NFR BLD AUTO: 58 %
NONHDLC SERPL-MCNC: 231 MG/DL
PLATELET # BLD AUTO: 261 10E3/UL (ref 150–450)
POTASSIUM SERPL-SCNC: 4.2 MMOL/L (ref 3.4–5.3)
PROT SERPL-MCNC: 7.5 G/DL (ref 6.4–8.3)
RBC # BLD AUTO: 4.51 10E6/UL (ref 3.8–5.2)
SODIUM SERPL-SCNC: 141 MMOL/L (ref 136–145)
TRIGL SERPL-MCNC: 59 MG/DL
TSH SERPL DL<=0.005 MIU/L-ACNC: 3.49 UIU/ML (ref 0.3–4.2)
WBC # BLD AUTO: 6.5 10E3/UL (ref 4–11)

## 2023-09-20 PROCEDURE — 87624 HPV HI-RISK TYP POOLED RSLT: CPT | Performed by: FAMILY MEDICINE

## 2023-09-20 PROCEDURE — 82306 VITAMIN D 25 HYDROXY: CPT | Performed by: FAMILY MEDICINE

## 2023-09-20 PROCEDURE — 86803 HEPATITIS C AB TEST: CPT | Performed by: FAMILY MEDICINE

## 2023-09-20 PROCEDURE — 99396 PREV VISIT EST AGE 40-64: CPT | Performed by: FAMILY MEDICINE

## 2023-09-20 PROCEDURE — 80053 COMPREHEN METABOLIC PANEL: CPT | Performed by: FAMILY MEDICINE

## 2023-09-20 PROCEDURE — 36415 COLL VENOUS BLD VENIPUNCTURE: CPT | Performed by: FAMILY MEDICINE

## 2023-09-20 PROCEDURE — G0145 SCR C/V CYTO,THINLAYER,RESCR: HCPCS | Performed by: FAMILY MEDICINE

## 2023-09-20 PROCEDURE — 83690 ASSAY OF LIPASE: CPT | Performed by: FAMILY MEDICINE

## 2023-09-20 PROCEDURE — 99214 OFFICE O/P EST MOD 30 MIN: CPT | Mod: 25 | Performed by: FAMILY MEDICINE

## 2023-09-20 PROCEDURE — 80061 LIPID PANEL: CPT | Performed by: FAMILY MEDICINE

## 2023-09-20 PROCEDURE — 83550 IRON BINDING TEST: CPT | Performed by: FAMILY MEDICINE

## 2023-09-20 PROCEDURE — 82728 ASSAY OF FERRITIN: CPT | Performed by: FAMILY MEDICINE

## 2023-09-20 PROCEDURE — 85025 COMPLETE CBC W/AUTO DIFF WBC: CPT | Performed by: FAMILY MEDICINE

## 2023-09-20 PROCEDURE — 83540 ASSAY OF IRON: CPT | Performed by: FAMILY MEDICINE

## 2023-09-20 PROCEDURE — 83036 HEMOGLOBIN GLYCOSYLATED A1C: CPT | Performed by: FAMILY MEDICINE

## 2023-09-20 PROCEDURE — 84443 ASSAY THYROID STIM HORMONE: CPT | Performed by: FAMILY MEDICINE

## 2023-09-20 RX ORDER — MECLIZINE HYDROCHLORIDE 25 MG/1
25 TABLET ORAL 3 TIMES DAILY PRN
Qty: 45 TABLET | Refills: 1 | Status: SHIPPED | OUTPATIENT
Start: 2023-09-20

## 2023-09-20 ASSESSMENT — PAIN SCALES - GENERAL: PAINLEVEL: MILD PAIN (3)

## 2023-09-20 NOTE — PROGRESS NOTES
"  {PROVIDER CHARTING PREFERENCE:261995}    Subjective   Gisel is a 52 year old, presenting for the following health issues:  Establish Care and Not Felling Well         9/20/2023     7:49 AM   Additional Questions   Roomed by Litzy Foreman CMA   Accompanied by self       History of Present Illness       Reason for visit:  Feeling off, some dizzyness, dull ache under right rib some tingling through body  Symptom onset:  3-4 weeks ago  Symptoms include:  Above  Symptom intensity:  Moderate  Symptom progression:  Staying the same  Had these symptoms before:  No  What makes it worse:  Eating makes dull ache under ribs feel worse    She eats 2-3 servings of fruits and vegetables daily.She consumes 1 sweetened beverage(s) daily.She exercises with enough effort to increase her heart rate 9 or less minutes per day.  She exercises with enough effort to increase her heart rate 3 or less days per week.   She is taking medications regularly.       {MA/LPN/RN Pre-Provider Visit Orders- hCG/UA/Strep (Optional):468790}  {SUPERLIST (Optional):533544}  {additonal problems for provider to add (Optional):525828}      Review of Systems   {ROS COMP (Optional):861018}      Objective    /70   Pulse 69   Temp 98  F (36.7  C) (Temporal)   Resp 16   Ht 1.626 m (5' 4.02\")   Wt 73.9 kg (163 lb)   LMP 07/18/2019 (Approximate)   SpO2 100%   Breastfeeding No   BMI 27.96 kg/m    Body mass index is 27.96 kg/m .  Physical Exam   {Exam List (Optional):001895}    {Diagnostic Test Results (Optional):857093}    {AMBULATORY ATTESTATION (Optional):809414}            "

## 2023-09-21 LAB
DEPRECATED CALCIDIOL+CALCIFEROL SERPL-MC: 27 UG/L (ref 20–75)
HCV AB SERPL QL IA: NONREACTIVE

## 2023-09-22 LAB
BKR LAB AP GYN ADEQUACY: NORMAL
BKR LAB AP GYN INTERPRETATION: NORMAL
BKR LAB AP HPV REFLEX: NORMAL
BKR LAB AP LMP: NORMAL
BKR LAB AP PREVIOUS ABNORMAL: NORMAL
PATH REPORT.COMMENTS IMP SPEC: NORMAL
PATH REPORT.COMMENTS IMP SPEC: NORMAL
PATH REPORT.RELEVANT HX SPEC: NORMAL

## 2023-09-22 NOTE — PROGRESS NOTES
SUBJECTIVE:   CC: Gisel is an 52 year old who presents for preventive health visit.       HPI  Patient new to provider, here to establish care and update physical. She also has concerns about dizziness, RUQ pain and questions about Family history Pancreatic cancer and premature CAD.  -She exercises occasionally  -She eats 5 servings of vegetables and fruits. She take over the counter calcium and Vitamin D supplements  -She needs an eye exam  -She gets dental visits done every 6 months.    Abdominal pain: s/p lap cholecystectomy 2022  Location: RUQ without radiation  Quality: dull  Quantity: 3/10 in intensity  Chronicity: Onset 3 months ago, stable since  Aggravating factors: nothing  Alleviating factors: nothing  She denies anorexia, nausea, vomiting, diarrhea, constipation, bloating, melena, hematochezia, dysuria, frequency, hematuria, belching, flatus, fever, chills, sweats, arthralgias, myalgias, and headache  Family history: positive for Father  from pancreatic cancer at age 83    Vertigo  Patient presents for evaluation of dizziness. The symptoms started 1 month(s) ago and are stable. The attacks occur intermittently and last 1 minutes. Positions that worsen symptoms: any motion. Previous workup/treatments: CT scan of Head and CTA neck . Associated ear symptoms: none. Associated CNS symptoms: none. Recent infections: none. Head trauma: denied. Drug ingestion: none. Noise exposure: no occupational exposure. Family history: non-contributory.    Multiple family members on statin and FH of CAD in parents at age 50's    Today's PHQ-2 Score:       2023     6:19 AM   PHQ-2 (  [employer])   Q1: Little interest or pleasure in doing things 0   Q2: Feeling down, depressed or hopeless 0   PHQ-2 Score 0   Q1: Little interest or pleasure in doing things Not at all   Q2: Feeling down, depressed or hopeless Not at all   PHQ-2 Score 0           Have you ever done Advance Care Planning? (For example, a Health  Directive, POLST, or a discussion with a medical provider or your loved ones about your wishes): No, advance care planning information given to patient to review.  Advanced care planning was discussed at today's visit.    Social History     Tobacco Use    Smoking status: Never    Smokeless tobacco: Never   Substance Use Topics    Alcohol use: Yes     Comment: 2 per week             2021     7:30 PM   Alcohol Use   Prescreen: >3 drinks/day or >7 drinks/week? No          No data to display              Reviewed orders with patient.  Reviewed health maintenance and updated orders accordingly - Yes  BP Readings from Last 3 Encounters:   23 128/70   23 134/78   22 122/78    Wt Readings from Last 3 Encounters:   23 73.9 kg (163 lb)   23 73 kg (161 lb)   22 73 kg (161 lb)                  Patient Active Problem List   Diagnosis    Hypercholesterolemia     Past Surgical History:   Procedure Laterality Date    EYE SURGERY  2010    Lasik    LAPAROSCOPIC CHOLECYSTECTOMY N/A 2022    Procedure: CHOLECYSTECTOMY, LAPAROSCOPIC;  Surgeon: Umesh Wen DO;  Location:  OR    [facility]  DELIVERY ONLY  2003    , Low Cervical       Social History     Tobacco Use    Smoking status: Never    Smokeless tobacco: Never   Substance Use Topics    Alcohol use: Yes     Comment: 2 per week     Family History   Problem Relation Age of Onset    Lipids Mother         high cholesterol    Hyperlipidemia Mother     Hypertension Father     Heart Disease Father         CAD    Hyperlipidemia Father     Prostate Cancer Father     Other Cancer Father         Pancreatic         Current Outpatient Medications   Medication Sig Dispense Refill    carbamide peroxide (DEBROX) 6.5 % otic solution Place 5 drops Into the left ear 2 times daily 15 mL 1    meclizine (ANTIVERT) 25 MG tablet Take 1 tablet (25 mg) by mouth 3 times daily as needed for dizziness 45 tablet 1    Multiple  Vitamins-Minerals (MULTIVITAL PO)        Allergies   Allergen Reactions    Benadryl [Diphenhydramine] Hives    Zithromax [Azithromycin Dihydrate]      Hives, unknown if from Zithromax.  Could have been new face cream or new type of popcorn she had that day      Recent Labs   Lab Test 23  0846 23  0720 22  1454 22  1454 19  1012   A1C 5.3  --   --   --   --    *  --   --   --   --    HDL 92  --   --   --   --    TRIG 59  --   --   --   --    ALT 20  --   --  25  --    CR 0.68 0.67   < > 0.60  --    GFRESTIMATED >90 >90   < > >90  --    POTASSIUM 4.2 4.1  --  3.7  --    TSH 3.49  --   --   --  2.39    < > = values in this interval not displayed.        Breast Cancer Screenin/22/2021     7:30 PM 2021    11:04 AM   Breast CA Risk Assessment (FHS-7)   Do you have a family history of breast, colon, or ovarian cancer? No / Unknown No / Unknown       click delete button to remove this line now  Mammogram Screening: Recommended annual mammography  Pertinent mammograms are reviewed under the imaging tab.    History of abnormal Pap smear: NO - age 30- 65 PAP every 3 years recommended  NO - age 30-65 PAP every 5 years with negative HPV co-testing recommended      Latest Ref Rng & Units 2017     2:22 PM 2017     2:20 PM 2014    12:00 AM   PAP / HPV   PAP (Historical)  NIL   NIL    HPV 16 DNA NEG  Negative     HPV 18 DNA NEG  Negative     Other HR HPV NEG  Negative       Reviewed and updated as needed this visit by clinical staff   Tobacco  Allergies  Meds   Med Hx  Surg Hx           Reviewed and updated as needed this visit by Provider       Med Hx  Surg Hx              Review of Systems  CONSTITUTIONAL: NEGATIVE for fever, chills, change in weight  INTEGUMENTARU/SKIN: NEGATIVE for worrisome rashes, moles or lesions  EYES: NEGATIVE for vision changes or irritation  ENT: NEGATIVE for ear, mouth and throat problems  RESP: NEGATIVE for significant cough or  "SOB  BREAST: NEGATIVE for masses, tenderness or discharge  CV: NEGATIVE for chest pain, palpitations or peripheral edema  GI: NEGATIVE for nausea, abdominal pain, heartburn, or change in bowel habits  : NEGATIVE for unusual urinary or vaginal symptoms. Periods are regular.  MUSCULOSKELETAL: NEGATIVE for significant arthralgias or myalgia  NEURO: NEGATIVE for weakness, dizziness or paresthesias  PSYCHIATRIC: NEGATIVE for changes in mood or affect     OBJECTIVE:   /70   Pulse 69   Temp 98  F (36.7  C) (Temporal)   Resp 16   Ht 1.626 m (5' 4.02\")   Wt 73.9 kg (163 lb)   LMP 07/18/2019 (Approximate)   SpO2 100%   Breastfeeding No   BMI 27.96 kg/m    Physical Exam  GENERAL: healthy, alert and no distress  EYES: Eyes grossly normal to inspection, PERRL and conjunctivae and sclerae normal  HENT: Left TM with cerumen, ear canals and TM's normal, nose and mouth without ulcers or lesions  NECK: no adenopathy, no asymmetry, masses, or scars and thyroid normal to palpation  RESP: lungs clear to auscultation - no rales, rhonchi or wheezes  BREAST: normal without masses, tenderness or nipple discharge and no palpable axillary masses or adenopathy  CV: regular rate and rhythm, normal S1 S2, no S3 or S4, no murmur, click or rub, no peripheral edema and peripheral pulses strong  ABDOMEN: soft, nontender, no hepatosplenomegaly, no masses and bowel sounds normal   (female): normal female external genitalia, normal urethral meatus, vaginal mucosa pink, moist, well rugated, and normal cervix/adnexa/uterus without masses or discharge  MS: no gross musculoskeletal defects noted, no edema  SKIN: no suspicious lesions or rashes  NEURO: Normal strength and tone, mentation intact and speech normal  PSYCH: mentation appears normal, affect normal/bright    Results for orders placed or performed in visit on 09/20/23   Comprehensive metabolic panel     Status: Normal   Result Value Ref Range    Sodium 141 136 - 145 mmol/L    " Potassium 4.2 3.4 - 5.3 mmol/L    Chloride 103 98 - 107 mmol/L    Carbon Dioxide (CO2) 26 22 - 29 mmol/L    Anion Gap 12 7 - 15 mmol/L    Urea Nitrogen 10.5 6.0 - 20.0 mg/dL    Creatinine 0.68 0.51 - 0.95 mg/dL    Calcium 9.1 8.6 - 10.0 mg/dL    Glucose 86 70 - 99 mg/dL    Alkaline Phosphatase 62 35 - 104 U/L    AST 26 0 - 45 U/L    ALT 20 0 - 50 U/L    Protein Total 7.5 6.4 - 8.3 g/dL    Albumin 4.6 3.5 - 5.2 g/dL    Bilirubin Total 0.5 <=1.2 mg/dL    GFR Estimate >90 >60 mL/min/1.73m2   Hepatitis C Screen Reflex to HCV RNA Quant and Genotype     Status: Normal   Result Value Ref Range    Hepatitis C Antibody Nonreactive Nonreactive    Narrative    Assay performance characteristics have not been established for newborns, infants, and children.   Iron and iron binding capacity     Status: Normal   Result Value Ref Range    Iron 119 37 - 145 ug/dL    Iron Binding Capacity 359 240 - 430 ug/dL    Iron Sat Index 33 15 - 46 %   Ferritin     Status: Normal   Result Value Ref Range    Ferritin 53 11 - 328 ng/mL   Vitamin D Deficiency     Status: Normal   Result Value Ref Range    Vitamin D, Total (25-Hydroxy) 27 20 - 75 ug/L    Narrative    Season, race, dietary intake, and treatment affect the concentration of 25-hydroxy-Vitamin D. Values may decrease during winter months and increase during summer months. Values 20-29 ug/L may indicate Vitamin D insufficiency and values <20 ug/L may indicate Vitamin D deficiency.    Vitamin D determination is routinely performed by an immunoassay specific for 25 hydroxyvitamin D3.  If an individual is on vitamin D2(ergocalciferol) supplementation, please specify 25 OH vitamin D2 and D3 level determination by LCMSMS test VITD23.     TSH with free T4 reflex     Status: Normal   Result Value Ref Range    TSH 3.49 0.30 - 4.20 uIU/mL   Lipid panel reflex to direct LDL Fasting     Status: Abnormal   Result Value Ref Range    Cholesterol 323 (H) <200 mg/dL    Triglycerides 59 <150 mg/dL     Direct Measure HDL 92 >=50 mg/dL    LDL Cholesterol Calculated 219 (H) <=100 mg/dL    Non HDL Cholesterol 231 (H) <130 mg/dL    Narrative    Cholesterol  Desirable:  <200 mg/dL    Triglycerides  Normal:  Less than 150 mg/dL  Borderline High:  150-199 mg/dL  High:  200-499 mg/dL  Very High:  Greater than or equal to 500 mg/dL    Direct Measure HDL  Female:  Greater than or equal to 50 mg/dL   Male:  Greater than or equal to 40 mg/dL    LDL Cholesterol  Desirable:  <100mg/dL  Above Desirable:  100-129 mg/dL   Borderline High:  130-159 mg/dL   High:  160-189 mg/dL   Very High:  >= 190 mg/dL    Non HDL Cholesterol  Desirable:  130 mg/dL  Above Desirable:  130-159 mg/dL  Borderline High:  160-189 mg/dL  High:  190-219 mg/dL  Very High:  Greater than or equal to 220 mg/dL   Hemoglobin A1c     Status: Normal   Result Value Ref Range    Hemoglobin A1C 5.3 0.0 - 5.6 %   Lipase     Status: Normal   Result Value Ref Range    Lipase 36 13 - 60 U/L   CBC with platelets and differential     Status: None   Result Value Ref Range    WBC Count 6.5 4.0 - 11.0 10e3/uL    RBC Count 4.51 3.80 - 5.20 10e6/uL    Hemoglobin 13.8 11.7 - 15.7 g/dL    Hematocrit 41.5 35.0 - 47.0 %    MCV 92 78 - 100 fL    MCH 30.6 26.5 - 33.0 pg    MCHC 33.3 31.5 - 36.5 g/dL    RDW 12.2 10.0 - 15.0 %    Platelet Count 261 150 - 450 10e3/uL    % Neutrophils 58 %    % Lymphocytes 28 %    % Monocytes 9 %    % Eosinophils 5 %    % Basophils 1 %    % Immature Granulocytes 0 %    Absolute Neutrophils 3.8 1.6 - 8.3 10e3/uL    Absolute Lymphocytes 1.8 0.8 - 5.3 10e3/uL    Absolute Monocytes 0.6 0.0 - 1.3 10e3/uL    Absolute Eosinophils 0.3 0.0 - 0.7 10e3/uL    Absolute Basophils 0.1 0.0 - 0.2 10e3/uL    Absolute Immature Granulocytes 0.0 <=0.4 10e3/uL   CBC with platelets and differential     Status: None    Narrative    The following orders were created for panel order CBC with platelets and differential.  Procedure                               Abnormality          Status                     ---------                               -----------         ------                     CBC with platelets and d...[[id number]]                      Final result                 Please view results for these tests on the individual orders.       ASSESSMENT/PLAN:   (Z00.00) Routine general medical examination at a health care facility  (primary encounter diagnosis)  Comment: See Counseling  Plan: REVIEW OF HEALTH MAINTENANCE PROTOCOL ORDERS       (R10.11) Right upper quadrant pain  Comment: Results reassuring, the patient declined further imaging at this time. Differentials for MSK, constipation or IBS  Plan: Comprehensive metabolic panel      (H81.10) Benign paroxysmal positional vertigo, unspecified laterality  Comment: she will schedule with her chiropractor for epley's maneuvers.  Plan: meclizine (ANTIVERT) 25 MG tablet, CBC with         platelets and differential         (E78.00) Hypercholesterolemia  Plan: CANCELED: Lipid panel reflex to direct LDL         Non-fasting          (Z12.11) Screen for colon cancer  Plan: Colonoscopy Screening  Referral      (Z11.59) Need for hepatitis C screening test  Plan: Hepatitis C Screen Reflex to HCV RNA Quant and         Genotype      (Z12.4) Cervical cancer screening  Plan: Pap Screen with HPV - recommended age 30 - 65         years, HPV Hold (Lab Only)      (R42) Dizziness  Plan: Iron and iron binding capacity, Ferritin      (Z13.21) Encounter for vitamin deficiency screening  Plan: Vitamin D Deficiency      (Z13.220) Lipid screening    Plan: Lipid panel reflex to direct LDL Fasting      (Z13.29) Screening for endocrine disorder    Plan: TSH with free T4 reflex, Hemoglobin A1c      (Z12.31) Encounter for screening mammogram for breast cancer    Plan: *MA Screening Digital Bilateral, CANCELED: *MA         Screening Digital Bilateral      (H61.22) Impacted cerumen of left ear    Plan: carbamide peroxide (DEBROX) 6.5 % otic solution       "    (Z80.0) Family history of malignant neoplasm of pancreas  Plan: Lipase      (Z12.83) Encounter for screening for malignant neoplasm of skin  Plan: Adult Dermatology Referral      (Z82.49) Family history of premature coronary heart disease  Plan: CT Coronary Calcium Scan    Patient has been advised of split billing requirements and indicates understanding: Yes      COUNSELING:  Reviewed preventive health counseling, as reflected in patient instructions       Regular exercise       Healthy diet/nutrition       Vision screening       Hearing screening       Osteoporosis prevention/bone health       Colorectal Cancer Screening       Consider Hep C screening for all patients one time for ages 18-79 years       Advance Care Planning      BMI:   Estimated body mass index is 27.96 kg/m  as calculated from the following:    Height as of this encounter: 1.626 m (5' 4.02\").    Weight as of this encounter: 73.9 kg (163 lb).   Weight management plan: Discussed healthy diet and exercise guidelines      She reports that she has never smoked. She has never used smokeless tobacco.          Bruce Stewart MD  [facility]  "

## 2023-09-25 LAB
HUMAN PAPILLOMA VIRUS 16 DNA: NEGATIVE
HUMAN PAPILLOMA VIRUS 18 DNA: NEGATIVE
HUMAN PAPILLOMA VIRUS FINAL DIAGNOSIS: NORMAL
HUMAN PAPILLOMA VIRUS OTHER HR: NEGATIVE

## 2023-09-27 ENCOUNTER — ANCILLARY PROCEDURE (OUTPATIENT)
Dept: CT IMAGING | Facility: CLINIC | Age: 53
End: 2023-09-27
Attending: FAMILY MEDICINE
Payer: COMMERCIAL

## 2023-09-27 DIAGNOSIS — Z82.49 FAMILY HISTORY OF PREMATURE CORONARY HEART DISEASE: ICD-10-CM

## 2023-09-27 DIAGNOSIS — E78.5 HYPERLIPIDEMIA LDL GOAL <100: ICD-10-CM

## 2023-09-27 DIAGNOSIS — R93.1 AGATSTON CORONARY ARTERY CALCIUM SCORE LESS THAN 100: Primary | ICD-10-CM

## 2023-09-27 PROCEDURE — 75571 CT HRT W/O DYE W/CA TEST: CPT | Mod: GC | Performed by: STUDENT IN AN ORGANIZED HEALTH CARE EDUCATION/TRAINING PROGRAM

## 2023-09-27 RX ORDER — ROSUVASTATIN CALCIUM 20 MG/1
20 TABLET, COATED ORAL DAILY
Qty: 90 TABLET | Refills: 0 | Status: SHIPPED | OUTPATIENT
Start: 2023-09-27 | End: 2023-12-26

## 2023-09-27 RX ORDER — ASPIRIN 81 MG/1
81 TABLET ORAL
Qty: 90 TABLET | Refills: 1 | Status: SHIPPED | OUTPATIENT
Start: 2023-09-27

## 2024-02-13 ENCOUNTER — TRANSCRIBE ORDERS (OUTPATIENT)
Dept: OTHER | Age: 54
End: 2024-02-13

## 2024-02-13 DIAGNOSIS — R42 DIZZINESS AND GIDDINESS: Primary | ICD-10-CM

## 2024-02-13 NOTE — PROGRESS NOTES
Called and left message for patient that surgery is scheduled on 2/21/24 at 7:30 am   Pt notified of labs results, No questions or concerns. Ann Almonte, CMA

## 2024-02-15 ENCOUNTER — THERAPY VISIT (OUTPATIENT)
Dept: PHYSICAL THERAPY | Facility: CLINIC | Age: 54
End: 2024-02-15
Attending: SPECIALIST
Payer: COMMERCIAL

## 2024-02-15 DIAGNOSIS — R42 DIZZINESS AND GIDDINESS: Primary | ICD-10-CM

## 2024-02-15 PROCEDURE — 95992 CANALITH REPOSITIONING PROC: CPT | Mod: GP | Performed by: PHYSICAL THERAPIST

## 2024-02-15 PROCEDURE — 97112 NEUROMUSCULAR REEDUCATION: CPT | Mod: GP,59 | Performed by: PHYSICAL THERAPIST

## 2024-02-15 PROCEDURE — 97161 PT EVAL LOW COMPLEX 20 MIN: CPT | Mod: GP | Performed by: PHYSICAL THERAPIST

## 2024-02-15 NOTE — PROGRESS NOTES
PHYSICAL THERAPY EVALUATION  Type of Visit: Evaluation    See electronic medical record for Abuse and Falls Screening details.    Subjective   Patient reports may 2023 had BPPV and had epley and was much improved . But after that she still had some issues had ringing in ears x 6 months , and then went on cruise in jan and had cold and congested and felt she was great on cruise but then when she got home started to feel off balance , unsteady if she stands and closes her eyes will sway and when she is stitting feels like she is constantly rocking forward . PM healthy   Work banker . Exercise treadmill 30 minutes normally has stopped the last month       Presenting condition or subjective complaint:    Date of onset: 01/07/24    Relevant medical history:     Dates & types of surgery:      Prior diagnostic imaging/testing results:       Prior therapy history for the same diagnosis, illness or injury:        Prior Level of Function  Transfers: Independent  Ambulation: Independent  ADL: Independent  IADL:  independent     Living Environment  Social support:      Type of home:   home   Stairs to enter the home:         Ramp:   2 story with stairs  Stairs inside the home:         Help at home:    Equipment owned:       Employment:      Hobbies/Interests:      Patient goals for therapy:      Pain assessment:      Objective      Cognitive Status Examination  Orientation: Oriented to person, place and time   Level of Consciousness: Alert  Follows Commands and Answers Questions:   Personal Safety and Judgement:   Memory:     OBSERVATION: WFL   INTEGUMENTARY:   POSTURE: WFL but does go to chiro for stiffness in her neck   PALPATION:   RANGE OF MOTION: WFL in neck and B UE but c/o stiffness in neck   STRENGTH: 5/5 in B UE and LE     BED MOBILITY: independent     TRANSFERS:     WHEELCHAIR MOBILITY:     GAIT:   Level of [place]: independent   Assistive Device(s):   Gait Deviations:   Gait Distance:   Stairs:      BALANCE: single leg stance B 30 seconds , able to stand with feet together and only very slight sway but patient feels its significant         SENSATION:     REFLEXES:   COORDINATION:   MUSCLE TONE:        VESTIBULAR EVALUATION  ADDITIONAL HISTORY:  Description of symptoms:    Dizzy attacks:   Start:     Last attack:     Frequency of occurrences:     Length of attack:    Difficulty hearing:    Noise in ears?      Alleviates symptoms:    Worsens symptoms:    Activities that bring on symptoms:         Pertinent visual history:   Pertinent history of current vestibular problem:   DHI: Total Score: 66    Cervicogenic Screen    Neck ROM stiff   Vertebral Artery Test Normal   Alar Ligament Test    Transverse Ligament Test    Distraction    Neck Torsion Test (head still, body rotating)    Neck Torsion Test (head and body rotating)         Oculomotor Screen    Ocular ROM WFL   Smooth Pursuit No issues    Saccades With laterial movement felt like she was having more issues with off balance and pushing    VOR    VOR Cancellation    Head Impulse Test    Convergence Testing         Infrared Goggle Exam Vestibular Suppressant in Last 24 Hours?   Exam Completed With:    Spontaneous Nystagmus    Gaze Evoked Nystagmus    Head Shake Horizontal Nystagmus    Positional Testing    Supine Head-Hanging Test     Left Right   [place] No nystagmus but a constant feeling of off balance and pushing  No nystagmus but a constant feeling of off balance and pushing    Sidelying Test     [facility] Supine Roll Test     [facility] Forward Roll Test     [place] and Lean Test -  Sitting Erect    [place] and Lean Test - Seated, Head Bent 60 Degrees Forward    [place] and Lean Test - Seated, Head Bent Backwards       BPPV Canal(s):   BPPV Type:     Dynamic Visual Acuity (DVA)    Static Acuity (LogMar)    Horizontal Head Movement at 1 Hz (LogMar)    Horizontal Head Movement at 2 Hz (LogMar)           Assessment & Plan   CLINICAL IMPRESSIONS  Medical Diagnosis:  dizziness and giddiness R42    Treatment Diagnosis: constant feeling of off balance   Impression/Assessment: Patient is a 53 year old female with off balance and pushing  complaints.  The following significant findings have been identified: Disequilibrium . These impairments interfere with their ability to perform self care tasks, work tasks, recreational activities, household chores, household mobility, and community mobility as compared to previous level of function.     Clinical Decision Making (Complexity):  Clinical Presentation: Stable/Uncomplicated  Clinical Presentation Rationale: based on medical and personal factors listed in PT evaluation  Clinical Decision Making (Complexity): Low complexity    PLAN OF CARE  Treatment Interventions:  Modalities:  modalities as needed   Interventions: Manual Therapy, Neuromuscular Re-education, Therapeutic Activity, Therapeutic Exercise, Canalith Repositioning    Long Term Goals            Frequency of Treatment:    Duration of Treatment:      Recommended Referrals to Other Professionals:   Education Assessment:        Risks and benefits of evaluation/treatment have been explained.   Patient/Family/caregiver agrees with Plan of Care.     Evaluation Time:             Signing Clinician: Phyllis Patten, PT

## 2024-02-29 ENCOUNTER — THERAPY VISIT (OUTPATIENT)
Dept: PHYSICAL THERAPY | Facility: CLINIC | Age: 54
End: 2024-02-29
Attending: SPECIALIST
Payer: COMMERCIAL

## 2024-02-29 DIAGNOSIS — R42 DIZZINESS AND GIDDINESS: Primary | ICD-10-CM

## 2024-02-29 PROCEDURE — 97112 NEUROMUSCULAR REEDUCATION: CPT | Mod: GP | Performed by: PHYSICAL THERAPIST

## 2024-03-19 NOTE — PROGRESS NOTES
DISCHARGE  Reason for Discharge: No further expectation of progress.  Patient seen for 2 Rx sessions and she cancelled her last Rx on 3/14/24 and as of 3/19/24 she has made no further contact with PT   Equipment Issued: none    Discharge Plan: Patient to continue home program.    Referring Provider:  Hiram Stephen     02/29/24 0500   Appointment Info   Signing clinician's name / credentials sulaiman chung PT   Total/Authorized Visits 2HP   Medical Diagnosis dizziness and giddiness R42   PT Tx Diagnosis constant feeling of off balance   Progress Note/Certification   Onset of illness/injury or Date of Surgery 01/07/24   Therapy Frequency every other week x 12 weeks   GOALS   PT Goals 2;3   PT Goal 1   Goal Identifier 1   Goal Description instruction in HEP and compliant with it 5 of 7 days to improve quality of life   Target Date 05/14/24   PT Goal 2   Goal Identifier 2   Goal Description patient to report overall decrease in her off balance feeling by 75%   Target Date 05/14/24   PT Goal 3   Goal Identifier 3   Goal Description patient to have improved feeling regarding her off balance feeling with improved dizzy score currenty 66   Target Date 05/14/24   Subjective Report   Subjective Report felt fine   Objective Measure 1   Objective Measure able to stand on single leg left 1 minute , right 1 minute with 1 touch down   Treatment Interventions (PT)   Interventions Neuromuscular Re-education;Standard Canalith Repositioning   Neuromuscular Re-education   Neuromuscular re-ed of mvmt, balance, coord, kinesthetic sense, posture, proprioception minutes (27218) 30   Neuro Re-ed 1 - Details instruction in HEP and exercises , patient eduction regarding vestibular issues ,  gaze stabilization exercises head still and move eyes center , left/right and up/down , eyes still and move head left /right and up down 3x goal 5 2x day , grounding of body and vision, added walking and looking left/right and up / down , standing on  foam mat and playing catch or just standing rhomberg or with eyes closed , and single leg balance 1 minute   Skilled Intervention instruction in HEP and exercise   Patient Response/Progress good understanding and tolerance   Education   Learner/Method Patient;Listening;Reading;Demonstration;Pictures/Video;No Barriers to Learning   Plan   Home program instruction in HEP and exercises , patient eduction regarding vestibular issues , gaze stabilization exercises head still and move eyes center , left/right and up/down , eyes still and move head left /right and up down 3x goal 5 2x day , grounding of body and vision, added walking and looking left/right and up / down , standing on foam mat and playing catch or just standing rhomberg or with eyes closed , and single leg balance 1 minute   Updates to plan of care every other week x 12   Plan for next session HEP and exercises   Total Session Time   Timed Code Treatment Minutes 30   Total Treatment Time (sum of timed and untimed services) 30

## 2024-10-24 ENCOUNTER — OFFICE VISIT (OUTPATIENT)
Dept: FAMILY MEDICINE | Facility: CLINIC | Age: 54
End: 2024-10-24
Payer: COMMERCIAL

## 2024-10-24 VITALS
BODY MASS INDEX: 28.36 KG/M2 | DIASTOLIC BLOOD PRESSURE: 72 MMHG | OXYGEN SATURATION: 99 % | RESPIRATION RATE: 16 BRPM | HEIGHT: 65 IN | HEART RATE: 82 BPM | SYSTOLIC BLOOD PRESSURE: 126 MMHG | TEMPERATURE: 97.7 F | WEIGHT: 170.25 LBS

## 2024-10-24 DIAGNOSIS — K29.00 ACUTE GASTRITIS WITHOUT HEMORRHAGE, UNSPECIFIED GASTRITIS TYPE: ICD-10-CM

## 2024-10-24 DIAGNOSIS — K21.01 GASTROESOPHAGEAL REFLUX DISEASE WITH ESOPHAGITIS AND HEMORRHAGE: Primary | ICD-10-CM

## 2024-10-24 PROCEDURE — 99213 OFFICE O/P EST LOW 20 MIN: CPT

## 2024-10-24 RX ORDER — FAMOTIDINE 40 MG/1
40 TABLET, FILM COATED ORAL AT BEDTIME
Qty: 90 TABLET | Refills: 3 | Status: SHIPPED | OUTPATIENT
Start: 2024-10-24

## 2024-10-24 RX ORDER — OMEPRAZOLE 40 MG/1
40 CAPSULE, DELAYED RELEASE ORAL DAILY
Qty: 90 CAPSULE | Refills: 3 | Status: SHIPPED | OUTPATIENT
Start: 2024-10-24

## 2024-10-24 ASSESSMENT — ENCOUNTER SYMPTOMS: ABDOMINAL PAIN: 1

## 2024-10-24 ASSESSMENT — PAIN SCALES - GENERAL: PAINLEVEL_OUTOF10: MILD PAIN (2)

## 2024-10-24 NOTE — PROGRESS NOTES
"  Assessment & Plan     Gastroesophageal reflux disease with esophagitis and hemorrhage  - omeprazole (PRILOSEC) 40 MG DR capsule; Take 1 capsule (40 mg) by mouth daily.  - famotidine (PEPCID) 40 MG tablet; Take 1 tablet (40 mg) by mouth at bedtime.    Acute gastritis without hemorrhage, unspecified gastritis type  - omeprazole (PRILOSEC) 40 MG DR capsule; Take 1 capsule (40 mg) by mouth daily.  - famotidine (PEPCID) 40 MG tablet; Take 1 tablet (40 mg) by mouth at bedtime.        I spent a total of 13 minutes on the day of the visit.   Time spent by me doing chart review, history and exam, documentation and further activities per the note    BMI  Estimated body mass index is 28.02 kg/m  as calculated from the following:    Height as of this encounter: 1.66 m (5' 5.35\").    Weight as of this encounter: 77.2 kg (170 lb 4 oz).         See Patient Instructions  Patient Instructions   Assessment  - Likely diagnosis of gastritis or peptic ulcer disease, possibly due to irritants such as ibuprofen and alcohol.  - Consideration of gastroesophageal reflux disease (GERD) contributing to heartburn symptoms.  - Differential diagnosis includes potential hiatal hernia if symptoms persist, though not confirmed at this time.    Plan  - Start Omeprazole 40 mg daily, 30 minutes to an hour before eating or drinking in the morning.  - Take Famotidine 40 mg at bedtime.  - Avoid alcohol and ibuprofen; Tylenol is acceptable for pain relief.  - Monitor for signs of GI bleeding, such as black tarry stools or bright red blood in stool.  - Follow up in one month to assess symptom improvement; contact if symptoms worsen.  - Consider imaging for hiatal hernia if symptoms persist after one month.  - Recommendations to avoid coffee and spicy foods to reduce gastritis symptoms.      Take omeprazole 40 mg first thing in the morning on empty stomach.  Wait 1 hour prior to eating or taking other medications.     Take famotidine 40 mg tablet each " night prior to bedtime.     Follow-up in 1 month, sooner if needed     Schedule follow-up with primary care provider     Discussed additional information regarding upper GI bleeding     Avoid ibuprofen and alcohol     Learning About Acid-Reducing Medicines  What are they?     Acid-reducing medicines can help relieve heartburn and other symptoms of indigestion. They can help prevent damage to your digestive system from stomach acids. They also are used to treat reflux and ulcer symptoms.  These medicines include H2 blockers and proton pump inhibitors (PPIs). They help your stomach make less acid. You can buy them over the counter. Some of them also come in prescription strengths.  Antacids can also help relieve heartburn symptoms. They reduce the acid that is already in your stomach. You can buy them over the counter.  Which medicine is best for you depends on what is causing your symptoms.  How do they work?  Acid-reducing medicines work in two ways. H2 blockers and proton pump inhibitors (PPIs) lower the amount of acid your stomach makes. They don't work on the acid that's already there. Antacids work by making stomach juices less acidic. But your heartburn may come back as your stomach makes more acid.  What are some examples?  Examples of acid reducers include:  H2 blockers.  Tagamet (cimetidine)  Pepcid (famotidine)  Proton pump inhibitors (PPIs).  Nexium (esomeprazole)  Prevacid (lansoprazole)  Prilosec, Zegerid (omeprazole)  Protonix (pantoprazole)  Aciphex (rabeprazole)  Antacids.  Gaviscon  Mylanta  Maalox  Tums  What are side effects might you have?  Many people don't have side effects. And minor side effects might go away after a while.  H2 blockers can cause headaches or make you dizzy. They might cause diarrhea or constipation. You may have nausea and vomiting.  PPIs can cause headaches and diarrhea. Using them for a long time may raise your risk for infections or broken bones.  Some antacids can cause  "constipation or diarrhea. The brands vary in the ingredients they use. They can have different side effects.  If you use too much heartburn medicine, your body may not get enough of some minerals from your food.  How can you take these medicines safely?  Some H2 blockers and PPIs can affect how other medicines work. Tell your doctor if you use other medicines. He or she may change the dose or give you a different medicine.  Many antacids have aspirin in them. Read the label to make sure that you don't take too much. Too much aspirin can be harmful.  Be safe with medicines. Take your medicines exactly as prescribed. If you take over-the-counter medicine, be sure to read and follow all instructions on the label. Call your doctor if you think you are having a problem with your medicine.  Check with your doctor or pharmacist before you use any other medicines. This includes over-the-counter medicines. Tell your doctor about all of the medicines, vitamins, herbal products, and supplements you take. Taking some medicines together can cause problems.  Follow-up care is a key part of your treatment and safety. Be sure to make and go to all appointments, and call your doctor if you are having problems. It's also a good idea to know your test results and keep a list of the medicines you take.        What is a GI bleed?  \"GI\" stands for \"gastrointestinal.\" The GI system, or GI tract, includes all of the organs in the body that process food (figure 1). This includes the:  ?Esophagus (the tube that connects the mouth to the stomach)  ?Stomach  ?Small intestine (small bowel)  ?Large intestine (colon or large bowel)  A GI bleed is when any of these organs start to bleed. Often, you do not know that you are bleeding, because it's happening inside your body. But sometimes, there are signs that it is happening.  There are 2 common types of GI bleeds:  ?\"Upper GI bleeds\" - These affect the esophagus, the stomach, and the first part of " "the small intestine.  ?\"Lower GI bleeds\" - These affect the large intestine (colon).  Bleeding can also happen in the middle of the small intestine, but this is much less common. This is sometimes called \"mid-GI bleeding.\"  What are the symptoms of a GI bleed?  The symptoms depend on whether you have an upper or lower GI bleed. Some people have no symptoms. They find out that they have bleeding when a doctor or nurse does a rectal exam on them or a blood test shows that they have something called \"anemia.\" (Anemia is when a person has too few red blood cells.)  The symptoms of an upper GI bleed can include:  ?Vomiting blood or something that looks like coffee grounds  ?Diarrhea or bowel movements that look like black tar - This can happen with lower GI bleeds, too, but it is less common.  The symptoms of a lower GI bleed can include:  ?Bowel movements that look bloody - This can happen with upper GI bleeds, too, but it is less common.  Symptoms that can happen with either an upper or lower GI bleed include:  ?Feeling weak, lightheaded, or woozy (especially if you lose a lot of blood)  ?Racing heartbeat (if you lose a lot of blood)  ?Cramps or belly pain  ?Diarrhea  ?Pale skin  Should I see a doctor or nurse?  See your doctor or nurse right away if you:  ?Vomit blood or something that looks like coffee grounds  ?Have a bowel movement that looks like tar or has blood in it  ?Feel weak, lightheaded, or woozy  ?Have a racing heartbeat  ?Have severe belly pain  ?Turn much paler than normal  What can cause a GI bleed?  The most common causes of GI bleeds include:  ?Ulcers in the stomach or small intestine - Ulcers are sores on the lining of the GI tract.  ?Problems with the blood vessels, for example:  Swollen veins in the esophagus called \"varices\"  Abnormal blood vessels called \"arteriovenous malformations\" (\"AVMs\")  Fragile, swollen blood vessels called \"gastric antral vascular ectasia\" (\"GAVE\")  ?Diverticulosis - This " "is a condition in which tiny pouches form in the lining of the intestine.  ?Crohn disease or ulcerative colitis - These are conditions that can cause sores to form in the lining of the gut.  ?Hemorrhoids - These are swollen veins in the rectum (the lower part of the colon).  ?Anal fissures - These are tears around the anus.  ?Polyps - These are small growths that can form inside the colon.  ?Cancer (this is rare)  Is there a test for a GI bleed?  Yes. If your doctor or nurse suspects that you have a GI bleed, they will order 1 or more tests. Tests include:  ?Blood tests to check if:  You have enough red blood cells (the cells that carry oxygen)  Your blood is clotting normally  Your liver is working normally  ?Upper endoscopy - For this test, you will get medicine to make you sleepy and relaxed. Then, a doctor puts a thin tube called an \"endoscope\" in your mouth and down your throat. The tube has a light on the end and a camera that sends images of your GI tract to a TV screen. If the doctor sees any spots that are bleeding, they can use tools that go through the endoscope to help stop the bleeding.  ?Colonoscopy - This test is similar to an upper endoscopy, but lets the doctor look inside the colon. The tube goes in through the anus (figure 2).  ?Imaging tests that involve putting a dye or weakly radioactive chemical into the blood - These allow doctors to trace where the blood goes.  ?Capsule endoscopy - This test uses a small camera about the size of a vitamin pill. You swallow the camera, and it sends pictures to a recording device that you wear on a belt for 8 hours. A doctor then looks at the pictures. This test lets doctors look at the small intestine, which is hard to see with upper endoscopy or colonoscopy because it is very long. After the test, the camera passes with a bowel movement. Most people do not notice when it comes out.  How is a GI bleed treated?  Treatment depends on how much blood you have " "lost and what seems to be causing your bleeding. You might get 1 or more of these treatments:  ?Oxygen - This can be given through a mask or a tube that sits under your nose.  ?Blood or fluids given by IV - An IV is a thin tube that goes into a vein. This might be done to replace blood that you lost or treat a bleeding disorder.  ?Medicines to reduce stomach acid or treat a bleeding disorder  ?Medicines to help clean out and empty your GI system - This lets the doctor see more clearly what is happening inside.  ?Antibiotics  ?A small tube that goes up your nose and down your throat - This is a way for the doctor to rinse out your stomach.  Depending on where the bleeding seems to be, you might also have an upper endoscopy, a colonoscopy, or both. This can help the doctors find where the bleeding is coming from. Doctors can sometimes use the endoscope or colonoscope to seal off blood vessels and stop them from bleeding.  After the bleeding has stopped, your doctor or nurse will probably want to learn why you started bleeding in the first place. If you have ulcers or another condition that could lead to bleeding, they will want to make sure that those problems are treated.  Can a GI bleed be prevented?  To help lower your chances of getting a GI bleed:  ?Do not take medicines called \"NSAIDs\" too often, unless your doctor tells you that it is OK - These medicines can cause ulcers. Examples of NSAIDs include aspirin, ibuprofen (sample brand names: Advil, Motrin), and naproxen (sample brand names: Aleve, Naprosyn). If you have to take these medicines regularly, your doctor might give you another medicine to decrease your risk of bleeding.  ?Get treated for stomach ulcers, if you have them.  ?Get treatment for esophageal varices, if you have them - These are swollen blood vessels in the esophagus. They are common in people with a liver disease called \"cirrhosis.\" Getting treatment with medicines or a procedure can lower " the risk of bleeding.     Patient understood and verbally consented to the treatment plan. Discussed symptoms that would warrant an urgent or emergent visit. All of the patients' questions were answered. Patient was instructed to contact the clinic if questions or concerns arise. Recommend follow up appointments if symptoms worsen or fail to improve. Recommend follow up as needed. Recommend ER in the case of an emergency.     Nae Locktet PA-C     Please note: Voice recognition software may have been used in preparing this note, unintended word substitutions may be present.      Subjective   Gisel is a 53 year old, presenting for the following health issues:  Abdominal Pain (X2 months)        10/24/2024     7:36 AM   Additional Questions   Roomed by Hayele     History of Present Illness       Reason for visit:  Stomach pain  Symptom onset:  More than a month  Symptoms include:  Pain after eating sometimes, discomfort.  Feel abdomen has changed shape  Symptom intensity:  Mild  Symptom progression:  Staying the same  Had these symptoms before:  No  What makes it worse:  Sitting   She is taking medications regularly.     After eating, sitting its worse, little bit after eating. Little bit of heart burn. Unsure which foods. Bloating and fullness. BM every day, sometimes can't go if not home. No blood in stool or urine. No nasue,a vomiting. No medications. Takes ibufrofen for aches and pains, twice a week, 2 tablets, somethimes take 4 day. Alchol 1-2 a day 2 or three times a week. Alcohol bother stomach.     Subjective  The patient reports experiencing abdominal pain for the last couple of months, which comes and goes. The pain is aggravated by lifting objects, eating, and sitting, becoming more noticeable after meals. There is a slight presence of heartburn, but no specific foods like fatty foods or pizza have been identified as triggers. The patient mentions having tacos the previous night, which seemed to  exacerbate the symptoms. Occasional bloating or fullness is noted. Bowel movements occur daily, but there are instances of missing a day or two, especially when not at home. There is no blood in the stool or urine, and no nausea or vomiting. The patient has not taken any medication for the stomach pain, as Pepto-Bismol causes sickness. Ibuprofen is taken twice a week for aches and pains, with doses ranging from two to four tablets, depending on the severity of headaches. Alcohol is consumed two to three times a week, typically one or two drinks per occasion, and is suspected to slightly aggravate symptoms. The patient has a history of gallbladder removal two years ago and has undergone a  and laparoscopy. No pain is reported in the urinary area, and no blood is noted. The patient expresses concern about the possibility of a hernia but has not noticed any significant symptoms. The patient s daughter is getting  in August next year.    Objective  - Physical exam:  - No hernia detected upon lifting head    Assessment  - Likely diagnosis of gastritis or peptic ulcer disease, possibly due to irritants such as ibuprofen and alcohol.  - Consideration of gastroesophageal reflux disease (GERD) contributing to heartburn symptoms.  - Differential diagnosis includes potential hiatal hernia if symptoms persist, though not confirmed at this time.    Plan  - Start Omeprazole 40 mg daily, 30 minutes to an hour before eating or drinking in the morning.  - Take Famotidine 40 mg at bedtime.  - Avoid alcohol and ibuprofen; Tylenol is acceptable for pain relief.  - Monitor for signs of GI bleeding, such as black tarry stools or bright red blood in stool.  - Follow up in one month to assess symptom improvement; contact if symptoms worsen.  - Consider imaging for hiatal hernia if symptoms persist after one month.  - Recommendations to avoid coffee and spicy foods to reduce gastritis symptoms.                  Objective   "  /72   Pulse 82   Temp 97.7  F (36.5  C) (Temporal)   Resp 16   Ht 1.66 m (5' 5.35\")   Wt 77.2 kg (170 lb 4 oz)   LMP 07/18/2019 (Approximate)   SpO2 99%   BMI 28.02 kg/m    Body mass index is 28.02 kg/m .  Physical Exam     Abdominal exam: Bowel sounds normal.  No hernia noted with lifting the head off the bed.  Tenderness palpation in the epigastric region.  No rebound tenderness, guarding, rigidity, or signs of acute abdomen  Office Visit on 09/20/2023   Component Date Value Ref Range Status    Interpretation 09/20/2023 Negative for Intraepithelial Lesion or Malignancy (NILM)    Final    Comment 09/20/2023    Final                    Value:This result contains rich text formatting which cannot be displayed here.    Specimen Adequacy 09/20/2023 Satisfactory for evaluation, endocervical/transformation zone component absent   Final    Clinical Information 09/20/2023    Final                    Value:This result contains rich text formatting which cannot be displayed here.    LMP/Menopause Date 09/20/2023    Final                    Value:This result contains rich text formatting which cannot be displayed here.    Reflex Testing 09/20/2023 Yes regardless of result   Final    Previous Abnormal? 09/20/2023    Final                    Value:This result contains rich text formatting which cannot be displayed here.    Performing Labs 09/20/2023    Final                    Value:This result contains rich text formatting which cannot be displayed here.    Sodium 09/20/2023 141  136 - 145 mmol/L Final    Potassium 09/20/2023 4.2  3.4 - 5.3 mmol/L Final    Chloride 09/20/2023 103  98 - 107 mmol/L Final    Carbon Dioxide (CO2) 09/20/2023 26  22 - 29 mmol/L Final    Anion Gap 09/20/2023 12  7 - 15 mmol/L Final    Urea Nitrogen 09/20/2023 10.5  6.0 - 20.0 mg/dL Final    Creatinine 09/20/2023 0.68  0.51 - 0.95 mg/dL Final    Calcium 09/20/2023 9.1  8.6 - 10.0 mg/dL Final    Glucose 09/20/2023 86  70 - 99 mg/dL " Final    Alkaline Phosphatase 09/20/2023 62  35 - 104 U/L Final    AST 09/20/2023 26  0 - 45 U/L Final    Reference intervals for this test were updated on 6/12/2023 to more accurately reflect our healthy population. There may be differences in the flagging of prior results with similar values performed with this method. Interpretation of those prior results can be made in the context of the updated reference intervals.    ALT 09/20/2023 20  0 - 50 U/L Final    Reference intervals for this test were updated on 6/12/2023 to more accurately reflect our healthy population. There may be differences in the flagging of prior results with similar values performed with this method. Interpretation of those prior results can be made in the context of the updated reference intervals.      Protein Total 09/20/2023 7.5  6.4 - 8.3 g/dL Final    Albumin 09/20/2023 4.6  3.5 - 5.2 g/dL Final    Bilirubin Total 09/20/2023 0.5  <=1.2 mg/dL Final    GFR Estimate 09/20/2023 >90  >60 mL/min/1.73m2 Final    Hepatitis C Antibody 09/20/2023 Nonreactive  Nonreactive Final    Iron 09/20/2023 119  37 - 145 ug/dL Final    Iron Binding Capacity 09/20/2023 359  240 - 430 ug/dL Final    Iron Sat Index 09/20/2023 33  15 - 46 % Final    Ferritin 09/20/2023 53  11 - 328 ng/mL Final    Vitamin D, Total (25-Hydroxy) 09/20/2023 27  20 - 75 ug/L Final    TSH 09/20/2023 3.49  0.30 - 4.20 uIU/mL Final    Cholesterol 09/20/2023 323 (H)  <200 mg/dL Final    Triglycerides 09/20/2023 59  <150 mg/dL Final    Direct Measure HDL 09/20/2023 92  >=50 mg/dL Final    LDL Cholesterol Calculated 09/20/2023 219 (H)  <=100 mg/dL Final    Non HDL Cholesterol 09/20/2023 231 (H)  <130 mg/dL Final    Hemoglobin A1C 09/20/2023 5.3  0.0 - 5.6 % Final    Normal <5.7%   Prediabetes 5.7-6.4%    Diabetes 6.5% or higher     Note: Adopted from ADA consensus guidelines.    Lipase 09/20/2023 36  13 - 60 U/L Final    WBC Count 09/20/2023 6.5  4.0 - 11.0 10e3/uL Final    RBC Count  09/20/2023 4.51  3.80 - 5.20 10e6/uL Final    Hemoglobin 09/20/2023 13.8  11.7 - 15.7 g/dL Final    Hematocrit 09/20/2023 41.5  35.0 - 47.0 % Final    MCV 09/20/2023 92  78 - 100 fL Final    MCH 09/20/2023 30.6  26.5 - 33.0 pg Final    MCHC 09/20/2023 33.3  31.5 - 36.5 g/dL Final    RDW 09/20/2023 12.2  10.0 - 15.0 % Final    Platelet Count 09/20/2023 261  150 - 450 10e3/uL Final    % Neutrophils 09/20/2023 58  % Final    % Lymphocytes 09/20/2023 28  % Final    % Monocytes 09/20/2023 9  % Final    % Eosinophils 09/20/2023 5  % Final    % Basophils 09/20/2023 1  % Final    % Immature Granulocytes 09/20/2023 0  % Final    Absolute Neutrophils 09/20/2023 3.8  1.6 - 8.3 10e3/uL Final    Absolute Lymphocytes 09/20/2023 1.8  0.8 - 5.3 10e3/uL Final    Absolute Monocytes 09/20/2023 0.6  0.0 - 1.3 10e3/uL Final    Absolute Eosinophils 09/20/2023 0.3  0.0 - 0.7 10e3/uL Final    Absolute Basophils 09/20/2023 0.1  0.0 - 0.2 10e3/uL Final    Absolute Immature Granulocytes 09/20/2023 0.0  <=0.4 10e3/uL Final    Other HR HPV 09/20/2023 Negative  Negative Final    HPV16 DNA 09/20/2023 Negative  Negative Final    HPV18 DNA 09/20/2023 Negative  Negative Final    FINAL DIAGNOSIS 09/20/2023    Final                    Value:This result contains rich text formatting which cannot be displayed here.     No results found for any visits on 10/24/24.  No results found.        Signed Electronically by: Nae Lockett PA-C

## 2024-10-24 NOTE — PATIENT INSTRUCTIONS
Assessment  - Likely diagnosis of gastritis or peptic ulcer disease, possibly due to irritants such as ibuprofen and alcohol.  - Consideration of gastroesophageal reflux disease (GERD) contributing to heartburn symptoms.  - Differential diagnosis includes potential hiatal hernia if symptoms persist, though not confirmed at this time.    Plan  - Start Omeprazole 40 mg daily, 30 minutes to an hour before eating or drinking in the morning.  - Take Famotidine 40 mg at bedtime.  - Avoid alcohol and ibuprofen; Tylenol is acceptable for pain relief.  - Monitor for signs of GI bleeding, such as black tarry stools or bright red blood in stool.  - Follow up in one month to assess symptom improvement; contact if symptoms worsen.  - Consider imaging for hiatal hernia if symptoms persist after one month.  - Recommendations to avoid coffee and spicy foods to reduce gastritis symptoms.      Take omeprazole 40 mg first thing in the morning on empty stomach.  Wait 1 hour prior to eating or taking other medications.     Take famotidine 40 mg tablet each night prior to bedtime.     Follow-up in 1 month, sooner if needed     Schedule follow-up with primary care provider     Discussed additional information regarding upper GI bleeding     Avoid ibuprofen and alcohol     Learning About Acid-Reducing Medicines  What are they?     Acid-reducing medicines can help relieve heartburn and other symptoms of indigestion. They can help prevent damage to your digestive system from stomach acids. They also are used to treat reflux and ulcer symptoms.  These medicines include H2 blockers and proton pump inhibitors (PPIs). They help your stomach make less acid. You can buy them over the counter. Some of them also come in prescription strengths.  Antacids can also help relieve heartburn symptoms. They reduce the acid that is already in your stomach. You can buy them over the counter.  Which medicine is best for you depends on what is causing your  symptoms.  How do they work?  Acid-reducing medicines work in two ways. H2 blockers and proton pump inhibitors (PPIs) lower the amount of acid your stomach makes. They don't work on the acid that's already there. Antacids work by making stomach juices less acidic. But your heartburn may come back as your stomach makes more acid.  What are some examples?  Examples of acid reducers include:  H2 blockers.  Tagamet (cimetidine)  Pepcid (famotidine)  Proton pump inhibitors (PPIs).  Nexium (esomeprazole)  Prevacid (lansoprazole)  Prilosec, Zegerid (omeprazole)  Protonix (pantoprazole)  Aciphex (rabeprazole)  Antacids.  Gaviscon  Mylanta  Maalox  Tums  What are side effects might you have?  Many people don't have side effects. And minor side effects might go away after a while.  H2 blockers can cause headaches or make you dizzy. They might cause diarrhea or constipation. You may have nausea and vomiting.  PPIs can cause headaches and diarrhea. Using them for a long time may raise your risk for infections or broken bones.  Some antacids can cause constipation or diarrhea. The brands vary in the ingredients they use. They can have different side effects.  If you use too much heartburn medicine, your body may not get enough of some minerals from your food.  How can you take these medicines safely?  Some H2 blockers and PPIs can affect how other medicines work. Tell your doctor if you use other medicines. He or she may change the dose or give you a different medicine.  Many antacids have aspirin in them. Read the label to make sure that you don't take too much. Too much aspirin can be harmful.  Be safe with medicines. Take your medicines exactly as prescribed. If you take over-the-counter medicine, be sure to read and follow all instructions on the label. Call your doctor if you think you are having a problem with your medicine.  Check with your doctor or pharmacist before you use any other medicines. This includes  "over-the-counter medicines. Tell your doctor about all of the medicines, vitamins, herbal products, and supplements you take. Taking some medicines together can cause problems.  Follow-up care is a key part of your treatment and safety. Be sure to make and go to all appointments, and call your doctor if you are having problems. It's also a good idea to know your test results and keep a list of the medicines you take.        What is a GI bleed?  \"GI\" stands for \"gastrointestinal.\" The GI system, or GI tract, includes all of the organs in the body that process food (figure 1). This includes the:  ?Esophagus (the tube that connects the mouth to the stomach)  ?Stomach  ?Small intestine (small bowel)  ?Large intestine (colon or large bowel)  A GI bleed is when any of these organs start to bleed. Often, you do not know that you are bleeding, because it's happening inside your body. But sometimes, there are signs that it is happening.  There are 2 common types of GI bleeds:  ?\"Upper GI bleeds\" - These affect the esophagus, the stomach, and the first part of the small intestine.  ?\"Lower GI bleeds\" - These affect the large intestine (colon).  Bleeding can also happen in the middle of the small intestine, but this is much less common. This is sometimes called \"mid-GI bleeding.\"  What are the symptoms of a GI bleed?  The symptoms depend on whether you have an upper or lower GI bleed. Some people have no symptoms. They find out that they have bleeding when a doctor or nurse does a rectal exam on them or a blood test shows that they have something called \"anemia.\" (Anemia is when a person has too few red blood cells.)  The symptoms of an upper GI bleed can include:  ?Vomiting blood or something that looks like coffee grounds  ?Diarrhea or bowel movements that look like black tar - This can happen with lower GI bleeds, too, but it is less common.  The symptoms of a lower GI bleed can include:  ?Bowel movements that look bloody - " "This can happen with upper GI bleeds, too, but it is less common.  Symptoms that can happen with either an upper or lower GI bleed include:  ?Feeling weak, lightheaded, or woozy (especially if you lose a lot of blood)  ?Racing heartbeat (if you lose a lot of blood)  ?Cramps or belly pain  ?Diarrhea  ?Pale skin  Should I see a doctor or nurse?  See your doctor or nurse right away if you:  ?Vomit blood or something that looks like coffee grounds  ?Have a bowel movement that looks like tar or has blood in it  ?Feel weak, lightheaded, or woozy  ?Have a racing heartbeat  ?Have severe belly pain  ?Turn much paler than normal  What can cause a GI bleed?  The most common causes of GI bleeds include:  ?Ulcers in the stomach or small intestine - Ulcers are sores on the lining of the GI tract.  ?Problems with the blood vessels, for example:  Swollen veins in the esophagus called \"varices\"  Abnormal blood vessels called \"arteriovenous malformations\" (\"AVMs\")  Fragile, swollen blood vessels called \"gastric antral vascular ectasia\" (\"GAVE\")  ?Diverticulosis - This is a condition in which tiny pouches form in the lining of the intestine.  ?Crohn disease or ulcerative colitis - These are conditions that can cause sores to form in the lining of the gut.  ?Hemorrhoids - These are swollen veins in the rectum (the lower part of the colon).  ?Anal fissures - These are tears around the anus.  ?Polyps - These are small growths that can form inside the colon.  ?Cancer (this is rare)  Is there a test for a GI bleed?  Yes. If your doctor or nurse suspects that you have a GI bleed, they will order 1 or more tests. Tests include:  ?Blood tests to check if:  You have enough red blood cells (the cells that carry oxygen)  Your blood is clotting normally  Your liver is working normally  ?Upper endoscopy - For this test, you will get medicine to make you sleepy and relaxed. Then, a doctor puts a thin tube called an \"endoscope\" in your mouth and " down your throat. The tube has a light on the end and a camera that sends images of your GI tract to a TV screen. If the doctor sees any spots that are bleeding, they can use tools that go through the endoscope to help stop the bleeding.  ?Colonoscopy - This test is similar to an upper endoscopy, but lets the doctor look inside the colon. The tube goes in through the anus (figure 2).  ?Imaging tests that involve putting a dye or weakly radioactive chemical into the blood - These allow doctors to trace where the blood goes.  ?Capsule endoscopy - This test uses a small camera about the size of a vitamin pill. You swallow the camera, and it sends pictures to a recording device that you wear on a belt for 8 hours. A doctor then looks at the pictures. This test lets doctors look at the small intestine, which is hard to see with upper endoscopy or colonoscopy because it is very long. After the test, the camera passes with a bowel movement. Most people do not notice when it comes out.  How is a GI bleed treated?  Treatment depends on how much blood you have lost and what seems to be causing your bleeding. You might get 1 or more of these treatments:  ?Oxygen - This can be given through a mask or a tube that sits under your nose.  ?Blood or fluids given by IV - An IV is a thin tube that goes into a vein. This might be done to replace blood that you lost or treat a bleeding disorder.  ?Medicines to reduce stomach acid or treat a bleeding disorder  ?Medicines to help clean out and empty your GI system - This lets the doctor see more clearly what is happening inside.  ?Antibiotics  ?A small tube that goes up your nose and down your throat - This is a way for the doctor to rinse out your stomach.  Depending on where the bleeding seems to be, you might also have an upper endoscopy, a colonoscopy, or both. This can help the doctors find where the bleeding is coming from. Doctors can sometimes use the endoscope or colonoscope to  "seal off blood vessels and stop them from bleeding.  After the bleeding has stopped, your doctor or nurse will probably want to learn why you started bleeding in the first place. If you have ulcers or another condition that could lead to bleeding, they will want to make sure that those problems are treated.  Can a GI bleed be prevented?  To help lower your chances of getting a GI bleed:  ?Do not take medicines called \"NSAIDs\" too often, unless your doctor tells you that it is OK - These medicines can cause ulcers. Examples of NSAIDs include aspirin, ibuprofen (sample brand names: Advil, Motrin), and naproxen (sample brand names: Aleve, Naprosyn). If you have to take these medicines regularly, your doctor might give you another medicine to decrease your risk of bleeding.  ?Get treated for stomach ulcers, if you have them.  ?Get treatment for esophageal varices, if you have them - These are swollen blood vessels in the esophagus. They are common in people with a liver disease called \"cirrhosis.\" Getting treatment with medicines or a procedure can lower the risk of bleeding.     Patient understood and verbally consented to the treatment plan. Discussed symptoms that would warrant an urgent or emergent visit. All of the patients' questions were answered. Patient was instructed to contact the clinic if questions or concerns arise. Recommend follow up appointments if symptoms worsen or fail to improve. Recommend follow up as needed. Recommend ER in the case of an emergency.     Nae Lockett PA-C     Please note: Voice recognition software may have been used in preparing this note, unintended word substitutions may be present.    "

## 2024-12-21 ENCOUNTER — HEALTH MAINTENANCE LETTER (OUTPATIENT)
Age: 54
End: 2024-12-21

## 2025-02-22 ENCOUNTER — HEALTH MAINTENANCE LETTER (OUTPATIENT)
Age: 55
End: 2025-02-22

## (undated) DEVICE — ESU GROUND PAD UNIVERSAL W/O CORD

## (undated) DEVICE — SOL NACL 0.9% INJ 1000ML BAG 07983-09

## (undated) DEVICE — SU MONOCRYL 4-0 PS-2 18" UND Y496G

## (undated) DEVICE — ESU SUCTION/IRRIGATION SYSTEM PISTOL GRIP

## (undated) DEVICE — DEVICE ACTIVE LAP PLUME FILTERATION PUREVIEW 620030100

## (undated) DEVICE — ESU CORD MONOPOLAR 10'  E0510

## (undated) DEVICE — GLOVE PROTEXIS W/NEU-THERA 7.5  2D73TE75

## (undated) DEVICE — ESU ENDO SCISSORS 5MM CVD 5DCS

## (undated) DEVICE — NDL INSUFFLATION 13GA 120MM C2201

## (undated) DEVICE — ENDO TROCAR SLEEVE KII Z-THREADED 05X100MM CTS02

## (undated) DEVICE — ENDO TROCAR FIRST ENTRY KII FIOS Z-THRD 05X100MM CTF03

## (undated) DEVICE — ADH SKIN CLOSURE PREMIERPRO EXOFIN 1.0ML 3470

## (undated) DEVICE — SUCTION MANIFOLD NEPTUNE 2 SYS 4 PORT 0702-020-000

## (undated) DEVICE — GLOVE PROTEXIS BLUE W/NEU-THERA 8.0  2D73EB80

## (undated) DEVICE — PACK GENERAL LAPAOSCOPY

## (undated) DEVICE — ENDO TROCAR FIRST ENTRY KII FIOS Z-THRD 11X100MM CTF33

## (undated) DEVICE — ENDO CLIP CARTIRDGE K2 MED/LG 10 1112

## (undated) DEVICE — ESU HOOK TIP 5MM CONMED

## (undated) RX ORDER — DEXAMETHASONE SODIUM PHOSPHATE 10 MG/ML
INJECTION, SOLUTION INTRAMUSCULAR; INTRAVENOUS
Status: DISPENSED
Start: 2022-06-20

## (undated) RX ORDER — PROPOFOL 10 MG/ML
INJECTION, EMULSION INTRAVENOUS
Status: DISPENSED
Start: 2022-06-20

## (undated) RX ORDER — ONDANSETRON 2 MG/ML
INJECTION INTRAMUSCULAR; INTRAVENOUS
Status: DISPENSED
Start: 2022-06-20

## (undated) RX ORDER — BUPIVACAINE HYDROCHLORIDE AND EPINEPHRINE 2.5; 5 MG/ML; UG/ML
INJECTION, SOLUTION EPIDURAL; INFILTRATION; INTRACAUDAL; PERINEURAL
Status: DISPENSED
Start: 2022-06-20

## (undated) RX ORDER — FENTANYL CITRATE 50 UG/ML
INJECTION, SOLUTION INTRAMUSCULAR; INTRAVENOUS
Status: DISPENSED
Start: 2022-06-20